# Patient Record
Sex: MALE | Race: BLACK OR AFRICAN AMERICAN | Employment: OTHER | URBAN - METROPOLITAN AREA
[De-identification: names, ages, dates, MRNs, and addresses within clinical notes are randomized per-mention and may not be internally consistent; named-entity substitution may affect disease eponyms.]

---

## 2020-10-28 ENCOUNTER — APPOINTMENT (OUTPATIENT)
Dept: LAB | Facility: HOSPITAL | Age: 60
End: 2020-10-28
Attending: INTERNAL MEDICINE
Payer: MEDICARE

## 2020-10-28 ENCOUNTER — HOSPITAL ENCOUNTER (OUTPATIENT)
Dept: GENERAL RADIOLOGY | Facility: HOSPITAL | Age: 60
Discharge: HOME | End: 2020-10-28
Attending: INTERNAL MEDICINE
Payer: MEDICARE

## 2020-10-28 DIAGNOSIS — Z01.811 PRE-PROCEDURAL RESPIRATORY EXAMINATION: ICD-10-CM

## 2020-10-28 DIAGNOSIS — Z01.810 PREPROCEDURAL CARDIOVASCULAR EXAMINATION: ICD-10-CM

## 2020-10-28 DIAGNOSIS — Z01.812 PRE-OPERATIVE LABORATORY EXAMINATION: ICD-10-CM

## 2020-10-28 PROCEDURE — 93005 ELECTROCARDIOGRAM TRACING: CPT

## 2020-10-28 PROCEDURE — 93010 ELECTROCARDIOGRAM REPORT: CPT | Performed by: INTERNAL MEDICINE

## 2020-10-28 PROCEDURE — 87641 MR-STAPH DNA AMP PROBE: CPT

## 2020-10-28 PROCEDURE — 80048 BASIC METABOLIC PNL TOTAL CA: CPT

## 2020-10-28 PROCEDURE — 36415 COLL VENOUS BLD VENIPUNCTURE: CPT

## 2020-10-28 PROCEDURE — 87086 URINE CULTURE/COLONY COUNT: CPT

## 2020-10-28 PROCEDURE — 71046 X-RAY EXAM CHEST 2 VIEWS: CPT | Performed by: INTERNAL MEDICINE

## 2020-10-28 PROCEDURE — 81001 URINALYSIS AUTO W/SCOPE: CPT

## 2020-10-28 PROCEDURE — 85610 PROTHROMBIN TIME: CPT

## 2020-10-28 PROCEDURE — 85025 COMPLETE CBC W/AUTO DIFF WBC: CPT

## 2020-11-07 ENCOUNTER — LAB ENCOUNTER (OUTPATIENT)
Dept: LAB | Facility: HOSPITAL | Age: 60
End: 2020-11-07
Attending: NEUROLOGICAL SURGERY
Payer: MEDICARE

## 2020-11-07 DIAGNOSIS — Z01.818 PREOP TESTING: ICD-10-CM

## 2020-11-07 PROCEDURE — 86900 BLOOD TYPING SEROLOGIC ABO: CPT

## 2020-11-07 PROCEDURE — 86901 BLOOD TYPING SEROLOGIC RH(D): CPT

## 2020-11-07 PROCEDURE — 36415 COLL VENOUS BLD VENIPUNCTURE: CPT

## 2020-11-07 PROCEDURE — 86850 RBC ANTIBODY SCREEN: CPT

## 2020-12-05 ENCOUNTER — APPOINTMENT (OUTPATIENT)
Dept: LAB | Facility: HOSPITAL | Age: 60
DRG: 460 | End: 2020-12-05
Attending: NEUROLOGICAL SURGERY
Payer: MEDICARE

## 2020-12-05 DIAGNOSIS — Z01.818 PREOPERATIVE TESTING: ICD-10-CM

## 2020-12-05 PROCEDURE — 36415 COLL VENOUS BLD VENIPUNCTURE: CPT

## 2020-12-05 PROCEDURE — 86850 RBC ANTIBODY SCREEN: CPT

## 2020-12-05 PROCEDURE — 86900 BLOOD TYPING SEROLOGIC ABO: CPT

## 2020-12-05 PROCEDURE — 86901 BLOOD TYPING SEROLOGIC RH(D): CPT

## 2020-12-05 RX ORDER — TIZANIDINE 4 MG/1
4 TABLET ORAL 3 TIMES DAILY
COMMUNITY

## 2020-12-05 RX ORDER — ATORVASTATIN CALCIUM 10 MG/1
10 TABLET, FILM COATED ORAL EVERY OTHER DAY
COMMUNITY

## 2020-12-05 RX ORDER — BENAZEPRIL HYDROCHLORIDE AND HYDROCHLOROTHIAZIDE 20; 12.5 MG/1; MG/1
1 TABLET ORAL DAILY
COMMUNITY

## 2020-12-05 RX ORDER — ACETAMINOPHEN 500 MG
1000 TABLET ORAL EVERY 6 HOURS PRN
COMMUNITY

## 2020-12-05 RX ORDER — ASPIRIN 81 MG/1
81 TABLET ORAL DAILY
Status: ON HOLD | COMMUNITY
End: 2020-12-10

## 2020-12-05 RX ORDER — AMLODIPINE BESYLATE 5 MG/1
5 TABLET ORAL NIGHTLY
COMMUNITY

## 2020-12-08 ENCOUNTER — ANESTHESIA (OUTPATIENT)
Dept: SURGERY | Facility: HOSPITAL | Age: 60
DRG: 460 | End: 2020-12-08
Payer: MEDICARE

## 2020-12-08 ENCOUNTER — APPOINTMENT (OUTPATIENT)
Dept: GENERAL RADIOLOGY | Facility: HOSPITAL | Age: 60
DRG: 460 | End: 2020-12-08
Attending: NEUROLOGICAL SURGERY
Payer: MEDICARE

## 2020-12-08 ENCOUNTER — HOSPITAL ENCOUNTER (INPATIENT)
Facility: HOSPITAL | Age: 60
LOS: 2 days | Discharge: HOME | DRG: 460 | End: 2020-12-10
Attending: NEUROLOGICAL SURGERY | Admitting: NEUROLOGICAL SURGERY
Payer: MEDICARE

## 2020-12-08 ENCOUNTER — ANESTHESIA EVENT (OUTPATIENT)
Dept: SURGERY | Facility: HOSPITAL | Age: 60
DRG: 460 | End: 2020-12-08
Payer: MEDICARE

## 2020-12-08 DIAGNOSIS — Z01.818 PREOPERATIVE TESTING: Primary | ICD-10-CM

## 2020-12-08 PROBLEM — E78.5 HYPERLIPIDEMIA: Chronic | Status: ACTIVE | Noted: 2020-12-08

## 2020-12-08 PROBLEM — M43.16 SPONDYLOLISTHESIS, LUMBAR REGION: Status: ACTIVE | Noted: 2020-12-08

## 2020-12-08 PROBLEM — I10 ESSENTIAL HYPERTENSION: Chronic | Status: ACTIVE | Noted: 2020-12-08

## 2020-12-08 PROCEDURE — 0SG00A0 FUSION OF LUMBAR VERTEBRAL JOINT WITH INTERBODY FUSION DEVICE, ANTERIOR APPROACH, ANTERIOR COLUMN, OPEN APPROACH: ICD-10-PCS | Performed by: NEUROLOGICAL SURGERY

## 2020-12-08 PROCEDURE — 4A11X4G MONITORING OF PERIPHERAL NERVOUS ELECTRICAL ACTIVITY, INTRAOPERATIVE, EXTERNAL APPROACH: ICD-10-PCS | Performed by: NEUROLOGICAL SURGERY

## 2020-12-08 PROCEDURE — 76000 FLUOROSCOPY <1 HR PHYS/QHP: CPT | Performed by: NEUROLOGICAL SURGERY

## 2020-12-08 PROCEDURE — 0QH004Z INSERTION OF INTERNAL FIXATION DEVICE INTO LUMBAR VERTEBRA, OPEN APPROACH: ICD-10-PCS | Performed by: NEUROLOGICAL SURGERY

## 2020-12-08 PROCEDURE — 99232 SBSQ HOSP IP/OBS MODERATE 35: CPT | Performed by: HOSPITALIST

## 2020-12-08 PROCEDURE — 0SB20ZZ EXCISION OF LUMBAR VERTEBRAL DISC, OPEN APPROACH: ICD-10-PCS | Performed by: NEUROLOGICAL SURGERY

## 2020-12-08 DEVICE — OSTEOCEL PRO MEDIUM: Type: IMPLANTABLE DEVICE | Site: BACK | Status: FUNCTIONAL

## 2020-12-08 DEVICE — IMPLANTABLE DEVICE: Type: IMPLANTABLE DEVICE | Site: BACK | Status: FUNCTIONAL

## 2020-12-08 DEVICE — RADIAN WASHER FACET SCREW: Type: IMPLANTABLE DEVICE | Site: BACK | Status: FUNCTIONAL

## 2020-12-08 DEVICE — RADIAN FACET SCREW 4.5X30MM: Type: IMPLANTABLE DEVICE | Site: BACK | Status: FUNCTIONAL

## 2020-12-08 RX ORDER — ATORVASTATIN CALCIUM 10 MG/1
10 TABLET, FILM COATED ORAL EVERY OTHER DAY
Status: DISCONTINUED | OUTPATIENT
Start: 2020-12-09 | End: 2020-12-10

## 2020-12-08 RX ORDER — AMLODIPINE BESYLATE 5 MG/1
5 TABLET ORAL NIGHTLY
Status: DISCONTINUED | OUTPATIENT
Start: 2020-12-08 | End: 2020-12-10

## 2020-12-08 RX ORDER — HYDROCODONE BITARTRATE AND ACETAMINOPHEN 10; 325 MG/1; MG/1
1 TABLET ORAL EVERY 4 HOURS PRN
Status: DISCONTINUED | OUTPATIENT
Start: 2020-12-08 | End: 2020-12-10

## 2020-12-08 RX ORDER — CEFAZOLIN SODIUM/WATER 2 G/20 ML
2 SYRINGE (ML) INTRAVENOUS EVERY 8 HOURS
Status: COMPLETED | OUTPATIENT
Start: 2020-12-08 | End: 2020-12-09

## 2020-12-08 RX ORDER — TIZANIDINE 4 MG/1
4 TABLET ORAL EVERY 8 HOURS PRN
Status: DISCONTINUED | OUTPATIENT
Start: 2020-12-08 | End: 2020-12-08

## 2020-12-08 RX ORDER — SENNOSIDES 8.6 MG
17.2 TABLET ORAL NIGHTLY
Status: DISCONTINUED | OUTPATIENT
Start: 2020-12-08 | End: 2020-12-10

## 2020-12-08 RX ORDER — PROCHLORPERAZINE EDISYLATE 5 MG/ML
10 INJECTION INTRAMUSCULAR; INTRAVENOUS EVERY 6 HOURS PRN
Status: DISCONTINUED | OUTPATIENT
Start: 2020-12-08 | End: 2020-12-10

## 2020-12-08 RX ORDER — BENAZEPRIL HYDROCHLORIDE AND HYDROCHLOROTHIAZIDE 20; 12.5 MG/1; MG/1
1 TABLET ORAL DAILY
Status: DISCONTINUED | OUTPATIENT
Start: 2020-12-09 | End: 2020-12-08 | Stop reason: RX

## 2020-12-08 RX ORDER — HYDROMORPHONE HYDROCHLORIDE 1 MG/ML
0.2 INJECTION, SOLUTION INTRAMUSCULAR; INTRAVENOUS; SUBCUTANEOUS EVERY 5 MIN PRN
Status: DISCONTINUED | OUTPATIENT
Start: 2020-12-08 | End: 2020-12-08 | Stop reason: HOSPADM

## 2020-12-08 RX ORDER — FAMOTIDINE 20 MG/1
20 TABLET ORAL ONCE
Status: COMPLETED | OUTPATIENT
Start: 2020-12-08 | End: 2020-12-08

## 2020-12-08 RX ORDER — SODIUM CHLORIDE, SODIUM LACTATE, POTASSIUM CHLORIDE, CALCIUM CHLORIDE 600; 310; 30; 20 MG/100ML; MG/100ML; MG/100ML; MG/100ML
INJECTION, SOLUTION INTRAVENOUS CONTINUOUS
Status: DISCONTINUED | OUTPATIENT
Start: 2020-12-08 | End: 2020-12-10

## 2020-12-08 RX ORDER — GLYCOPYRROLATE 0.2 MG/ML
INJECTION, SOLUTION INTRAMUSCULAR; INTRAVENOUS AS NEEDED
Status: DISCONTINUED | OUTPATIENT
Start: 2020-12-08 | End: 2020-12-08 | Stop reason: SURG

## 2020-12-08 RX ORDER — MORPHINE SULFATE 4 MG/ML
2 INJECTION, SOLUTION INTRAMUSCULAR; INTRAVENOUS EVERY 10 MIN PRN
Status: DISCONTINUED | OUTPATIENT
Start: 2020-12-08 | End: 2020-12-08 | Stop reason: HOSPADM

## 2020-12-08 RX ORDER — DIPHENHYDRAMINE HCL 25 MG
25 CAPSULE ORAL EVERY 4 HOURS PRN
Status: DISCONTINUED | OUTPATIENT
Start: 2020-12-08 | End: 2020-12-10

## 2020-12-08 RX ORDER — MIDAZOLAM HYDROCHLORIDE 1 MG/ML
INJECTION INTRAMUSCULAR; INTRAVENOUS AS NEEDED
Status: DISCONTINUED | OUTPATIENT
Start: 2020-12-08 | End: 2020-12-08 | Stop reason: SURG

## 2020-12-08 RX ORDER — ACETAMINOPHEN 325 MG/1
650 TABLET ORAL EVERY 4 HOURS PRN
Status: DISCONTINUED | OUTPATIENT
Start: 2020-12-08 | End: 2020-12-10

## 2020-12-08 RX ORDER — MORPHINE SULFATE 4 MG/ML
4 INJECTION, SOLUTION INTRAMUSCULAR; INTRAVENOUS EVERY 10 MIN PRN
Status: DISCONTINUED | OUTPATIENT
Start: 2020-12-08 | End: 2020-12-08 | Stop reason: HOSPADM

## 2020-12-08 RX ORDER — MORPHINE SULFATE 10 MG/ML
6 INJECTION, SOLUTION INTRAMUSCULAR; INTRAVENOUS EVERY 10 MIN PRN
Status: DISCONTINUED | OUTPATIENT
Start: 2020-12-08 | End: 2020-12-08 | Stop reason: HOSPADM

## 2020-12-08 RX ORDER — PHENYLEPHRINE HCL 10 MG/ML
VIAL (ML) INJECTION AS NEEDED
Status: DISCONTINUED | OUTPATIENT
Start: 2020-12-08 | End: 2020-12-08 | Stop reason: SURG

## 2020-12-08 RX ORDER — ONDANSETRON 2 MG/ML
4 INJECTION INTRAMUSCULAR; INTRAVENOUS EVERY 4 HOURS PRN
Status: ACTIVE | OUTPATIENT
Start: 2020-12-08 | End: 2020-12-09

## 2020-12-08 RX ORDER — DIPHENHYDRAMINE HYDROCHLORIDE 50 MG/ML
25 INJECTION INTRAMUSCULAR; INTRAVENOUS EVERY 4 HOURS PRN
Status: DISCONTINUED | OUTPATIENT
Start: 2020-12-08 | End: 2020-12-10

## 2020-12-08 RX ORDER — TIZANIDINE 4 MG/1
4 TABLET ORAL 3 TIMES DAILY
Status: DISCONTINUED | OUTPATIENT
Start: 2020-12-08 | End: 2020-12-10

## 2020-12-08 RX ORDER — HYDROMORPHONE HYDROCHLORIDE 1 MG/ML
0.6 INJECTION, SOLUTION INTRAMUSCULAR; INTRAVENOUS; SUBCUTANEOUS EVERY 5 MIN PRN
Status: DISCONTINUED | OUTPATIENT
Start: 2020-12-08 | End: 2020-12-08 | Stop reason: HOSPADM

## 2020-12-08 RX ORDER — BISACODYL 10 MG
10 SUPPOSITORY, RECTAL RECTAL
Status: DISCONTINUED | OUTPATIENT
Start: 2020-12-08 | End: 2020-12-10

## 2020-12-08 RX ORDER — ROCURONIUM BROMIDE 10 MG/ML
INJECTION, SOLUTION INTRAVENOUS AS NEEDED
Status: DISCONTINUED | OUTPATIENT
Start: 2020-12-08 | End: 2020-12-08 | Stop reason: SURG

## 2020-12-08 RX ORDER — HYDROCODONE BITARTRATE AND ACETAMINOPHEN 5; 325 MG/1; MG/1
1 TABLET ORAL AS NEEDED
Status: DISCONTINUED | OUTPATIENT
Start: 2020-12-08 | End: 2020-12-08 | Stop reason: HOSPADM

## 2020-12-08 RX ORDER — HYDROMORPHONE HYDROCHLORIDE 1 MG/ML
0.4 INJECTION, SOLUTION INTRAMUSCULAR; INTRAVENOUS; SUBCUTANEOUS EVERY 5 MIN PRN
Status: DISCONTINUED | OUTPATIENT
Start: 2020-12-08 | End: 2020-12-08 | Stop reason: HOSPADM

## 2020-12-08 RX ORDER — PROCHLORPERAZINE EDISYLATE 5 MG/ML
5 INJECTION INTRAMUSCULAR; INTRAVENOUS ONCE AS NEEDED
Status: DISCONTINUED | OUTPATIENT
Start: 2020-12-08 | End: 2020-12-08 | Stop reason: HOSPADM

## 2020-12-08 RX ORDER — LIDOCAINE HYDROCHLORIDE 10 MG/ML
INJECTION, SOLUTION EPIDURAL; INFILTRATION; INTRACAUDAL; PERINEURAL AS NEEDED
Status: DISCONTINUED | OUTPATIENT
Start: 2020-12-08 | End: 2020-12-08 | Stop reason: SURG

## 2020-12-08 RX ORDER — SODIUM PHOSPHATE, DIBASIC AND SODIUM PHOSPHATE, MONOBASIC 7; 19 G/133ML; G/133ML
1 ENEMA RECTAL ONCE AS NEEDED
Status: DISCONTINUED | OUTPATIENT
Start: 2020-12-08 | End: 2020-12-10

## 2020-12-08 RX ORDER — METOCLOPRAMIDE 10 MG/1
10 TABLET ORAL ONCE
Status: COMPLETED | OUTPATIENT
Start: 2020-12-08 | End: 2020-12-08

## 2020-12-08 RX ORDER — HYDROCODONE BITARTRATE AND ACETAMINOPHEN 10; 325 MG/1; MG/1
2 TABLET ORAL EVERY 4 HOURS PRN
Status: DISCONTINUED | OUTPATIENT
Start: 2020-12-08 | End: 2020-12-10

## 2020-12-08 RX ORDER — ACETAMINOPHEN 500 MG
1000 TABLET ORAL ONCE
Status: DISCONTINUED | OUTPATIENT
Start: 2020-12-08 | End: 2020-12-08 | Stop reason: HOSPADM

## 2020-12-08 RX ORDER — HYDROMORPHONE HYDROCHLORIDE 1 MG/ML
0.2 INJECTION, SOLUTION INTRAMUSCULAR; INTRAVENOUS; SUBCUTANEOUS EVERY 2 HOUR PRN
Status: DISCONTINUED | OUTPATIENT
Start: 2020-12-08 | End: 2020-12-10

## 2020-12-08 RX ORDER — EPHEDRINE SULFATE 50 MG/ML
INJECTION, SOLUTION INTRAVENOUS AS NEEDED
Status: DISCONTINUED | OUTPATIENT
Start: 2020-12-08 | End: 2020-12-08 | Stop reason: SURG

## 2020-12-08 RX ORDER — SODIUM CHLORIDE 9 MG/ML
INJECTION, SOLUTION INTRAVENOUS CONTINUOUS PRN
Status: DISCONTINUED | OUTPATIENT
Start: 2020-12-08 | End: 2020-12-08 | Stop reason: SURG

## 2020-12-08 RX ORDER — POLYETHYLENE GLYCOL 3350 17 G/17G
17 POWDER, FOR SOLUTION ORAL DAILY PRN
Status: DISCONTINUED | OUTPATIENT
Start: 2020-12-08 | End: 2020-12-10

## 2020-12-08 RX ORDER — SODIUM CHLORIDE, SODIUM LACTATE, POTASSIUM CHLORIDE, CALCIUM CHLORIDE 600; 310; 30; 20 MG/100ML; MG/100ML; MG/100ML; MG/100ML
INJECTION, SOLUTION INTRAVENOUS CONTINUOUS
Status: DISCONTINUED | OUTPATIENT
Start: 2020-12-08 | End: 2020-12-08 | Stop reason: HOSPADM

## 2020-12-08 RX ORDER — HYDROMORPHONE HYDROCHLORIDE 1 MG/ML
0.8 INJECTION, SOLUTION INTRAMUSCULAR; INTRAVENOUS; SUBCUTANEOUS EVERY 2 HOUR PRN
Status: DISCONTINUED | OUTPATIENT
Start: 2020-12-08 | End: 2020-12-10

## 2020-12-08 RX ORDER — DOCUSATE SODIUM 100 MG/1
100 CAPSULE, LIQUID FILLED ORAL 2 TIMES DAILY
Status: DISCONTINUED | OUTPATIENT
Start: 2020-12-08 | End: 2020-12-10

## 2020-12-08 RX ORDER — HYDROCODONE BITARTRATE AND ACETAMINOPHEN 5; 325 MG/1; MG/1
2 TABLET ORAL AS NEEDED
Status: DISCONTINUED | OUTPATIENT
Start: 2020-12-08 | End: 2020-12-08 | Stop reason: HOSPADM

## 2020-12-08 RX ORDER — ONDANSETRON 2 MG/ML
4 INJECTION INTRAMUSCULAR; INTRAVENOUS ONCE AS NEEDED
Status: DISCONTINUED | OUTPATIENT
Start: 2020-12-08 | End: 2020-12-08 | Stop reason: HOSPADM

## 2020-12-08 RX ORDER — CEFAZOLIN SODIUM/WATER 2 G/20 ML
2 SYRINGE (ML) INTRAVENOUS ONCE
Status: COMPLETED | OUTPATIENT
Start: 2020-12-08 | End: 2020-12-08

## 2020-12-08 RX ORDER — NALOXONE HYDROCHLORIDE 0.4 MG/ML
80 INJECTION, SOLUTION INTRAMUSCULAR; INTRAVENOUS; SUBCUTANEOUS AS NEEDED
Status: DISCONTINUED | OUTPATIENT
Start: 2020-12-08 | End: 2020-12-08 | Stop reason: HOSPADM

## 2020-12-08 RX ORDER — ONDANSETRON 2 MG/ML
INJECTION INTRAMUSCULAR; INTRAVENOUS AS NEEDED
Status: DISCONTINUED | OUTPATIENT
Start: 2020-12-08 | End: 2020-12-08 | Stop reason: SURG

## 2020-12-08 RX ORDER — HYDROMORPHONE HYDROCHLORIDE 1 MG/ML
0.4 INJECTION, SOLUTION INTRAMUSCULAR; INTRAVENOUS; SUBCUTANEOUS EVERY 2 HOUR PRN
Status: DISCONTINUED | OUTPATIENT
Start: 2020-12-08 | End: 2020-12-10

## 2020-12-08 RX ADMIN — MIDAZOLAM HYDROCHLORIDE 2 MG: 1 INJECTION INTRAMUSCULAR; INTRAVENOUS at 10:26:00

## 2020-12-08 RX ADMIN — EPHEDRINE SULFATE 5 MG: 50 INJECTION, SOLUTION INTRAVENOUS at 13:13:00

## 2020-12-08 RX ADMIN — CEFAZOLIN SODIUM/WATER 2 G: 2 G/20 ML SYRINGE (ML) INTRAVENOUS at 10:46:00

## 2020-12-08 RX ADMIN — PHENYLEPHRINE HCL 100 MCG: 10 MG/ML VIAL (ML) INJECTION at 11:57:00

## 2020-12-08 RX ADMIN — MIDAZOLAM HYDROCHLORIDE 1 MG: 1 INJECTION INTRAMUSCULAR; INTRAVENOUS at 10:44:00

## 2020-12-08 RX ADMIN — LIDOCAINE HYDROCHLORIDE 50 MG: 10 INJECTION, SOLUTION EPIDURAL; INFILTRATION; INTRACAUDAL; PERINEURAL at 10:31:00

## 2020-12-08 RX ADMIN — ROCURONIUM BROMIDE 5 MG: 10 INJECTION, SOLUTION INTRAVENOUS at 10:31:00

## 2020-12-08 RX ADMIN — PHENYLEPHRINE HCL 100 MCG: 10 MG/ML VIAL (ML) INJECTION at 12:38:00

## 2020-12-08 RX ADMIN — EPHEDRINE SULFATE 5 MG: 50 INJECTION, SOLUTION INTRAVENOUS at 12:59:00

## 2020-12-08 RX ADMIN — SODIUM CHLORIDE: 9 INJECTION, SOLUTION INTRAVENOUS at 11:00:00

## 2020-12-08 RX ADMIN — ONDANSETRON 4 MG: 2 INJECTION INTRAMUSCULAR; INTRAVENOUS at 10:27:00

## 2020-12-08 RX ADMIN — SODIUM CHLORIDE, SODIUM LACTATE, POTASSIUM CHLORIDE, CALCIUM CHLORIDE: 600; 310; 30; 20 INJECTION, SOLUTION INTRAVENOUS at 14:04:00

## 2020-12-08 RX ADMIN — LIDOCAINE HYDROCHLORIDE 50 MG: 10 INJECTION, SOLUTION EPIDURAL; INFILTRATION; INTRACAUDAL; PERINEURAL at 11:29:00

## 2020-12-08 RX ADMIN — PHENYLEPHRINE HCL 100 MCG: 10 MG/ML VIAL (ML) INJECTION at 11:51:00

## 2020-12-08 RX ADMIN — GLYCOPYRROLATE 0.2 MG: 0.2 INJECTION, SOLUTION INTRAMUSCULAR; INTRAVENOUS at 10:27:00

## 2020-12-08 NOTE — OPERATIVE REPORT
OPERATIVE REPORT      PATIENT NAME:  Eun Hoskins    DATE OF OPERATION:  12/08/2020      PREOPERATIVE DIAGNOSES:  1. Lumbar spondylosis with radiculopathy. 2.  Lumbar spondylolisthesis. POSTOPERATIVE DIAGNOSES:  1.   Lumbar spondylosis w lateral fascia. The lateral fascia was then opened longitudinally.   Then, finger dissection was used to open the lateral musculature, using finger dissection the retroperitoneal space was also opened, palpating along the back wall of the retroperitoneum, reduction in the spondylolisthesis. This also matched the adjacent disc space heights nicely. The trial was removed and the rasps and curettes were used to complete preparation of the endplate.   Next, a 22 mm x 10 mm x 60 mm 10° titanium lordotic cage was facet joint. The K wire was then removed and the facet screw was advanced the remainder of the way into the left L5 pedicle. The screw was stimulated and again greater than 20 mA of impedance was documented.   Tightness along the screw was verified and th

## 2020-12-08 NOTE — ANESTHESIA PROCEDURE NOTES
Airway  Urgency: Elective    Airway not difficult    General Information and Staff    Patient location during procedure: OR  Anesthesiologist: Miguelito Varner MD  Resident/CRNA: Nick Chavez CRNA  Performed: anesthesiologist and CRNA     Daphne

## 2020-12-08 NOTE — PROGRESS NOTES
[facility] - [facility]    Progress Note    Juniejeremi Andrade Patient Status:  Inpatient    1960 MRN [id number]   Location [address] [facility] Attending Rosi Manuel MD   Hosp Day # 0 PCP ALMITA Marino MD of percutaneous bilateral L4-5 trans-facet screws. PAIN CONTROL, NEURO CHECKS, DVT PROPHYLAXIS, PT/OT. Essential hypertension  CONT HOME MEDS, MONITOR. Hyperlipidemia  CONT HOME MEDS.              Results:     Lab Results   Component Value Date

## 2020-12-08 NOTE — BRIEF OP NOTE
Pre-Operative Diagnosis: other spondylosis with radiculopathy, lumbar egion     Post-Operative Diagnosis: other spondylosis with radiculopathy, lumbar egion      Procedure Performed:   Procedure(s):  L4-5 extreme lateral interbody fusion with metallic cage

## 2020-12-08 NOTE — ANESTHESIA PREPROCEDURE EVALUATION
Anesthesia PreOp Note    HPI:     Rakesh Rangel is a 56year old male who presents for preoperative consultation requested by: Jose Power MD    Date of Surgery: 12/8/2020    Procedure(s):  FAR LATERAL LUMBAR INTERBODY FUSION 1 LEVEL  Indication: o 12/8/2020 at 540        •  lactated ringers infusion, , Intravenous, Continuous, Yany Glynn MD, Last Rate: 20 mL/hr at 12/08/20 9099    •  acetaminophen (TYLENOL EXTRA STRENGTH) tab 1,000 mg, 1,000 mg, Oral, Once, Yany Glynn MD    •  ceFAZolin Not on file      Available pre-op labs reviewed. Lab Results   Component Value Date    WBC 4.2 10/28/2020    RBC 4.99 10/28/2020    HGB 15.0 10/28/2020    HCT 44.7 10/28/2020    MCV 89.6 10/28/2020    MCH 30.1 10/28/2020    MCHC 33.6 10/28/2020    RDW 12. of my ability. The patient desires the anesthetic management as planned.   Dorian NO  12/8/2020 9:35 AM

## 2020-12-08 NOTE — ANESTHESIA POSTPROCEDURE EVALUATION
Patient: Nay Bermudez    Procedure Summary     Date: 12/08/20 Room / Location: [facility] OR  / [facility] OR    Anesthesia Start: 5629 Anesthesia Stop:     Procedure: FAR LATERAL LUMBAR INTERBODY FUSION 1 LEVEL (N/A Back) Diagnosis: (other spondylosis w

## 2020-12-09 PROCEDURE — 99233 SBSQ HOSP IP/OBS HIGH 50: CPT | Performed by: HOSPITALIST

## 2020-12-09 NOTE — OCCUPATIONAL THERAPY NOTE
OCCUPATIONAL THERAPY EVALUATION - INPATIENT      Room Number: 413/413-A  Evaluation Date: 12/9/2020  Type of Evaluation: Initial  Presenting Problem: Pt s/p L4-5 extreme lateral interbody fusion 12/8 d/t spondylosis with radiculopathy.      Physician Order: Problems:    Essential hypertension    Hyperlipidemia      Past Medical History  Past Medical History:   Diagnosis Date   • Back problem    • Calculus of kidney     20 yrs ago [facility]    • High blood pressure    • High cholesterol    • Osteoarthritis    • PONV strength is within functional limits      ACTIVITIES OF DAILY LIVING ASSESSMENT  AM-PAC ‘6-Clicks’ Inpatient Daily Activity Short Form  How much help from another person does the patient currently need…  -   Putting on and taking off regular lower body serge

## 2020-12-09 NOTE — PLAN OF CARE
Problem: Patient Centered Care  Goal: Patient preferences are identified and integrated in the patient's plan of care  Description: Interventions:  - What would you like us to know as we care for you? Lives with his wife in their own home.   His wife had monitor pain and request assistance  - Assess pain using appropriate pain scale  - Administer analgesics based on type and severity of pain and evaluate response  - Implement non-pharmacological measures as appropriate and evaluate response  - Consider cul post-discharge preferences of patient/family/discharge partner  - Complete POLST form as appropriate  - Assess patient's ability to be responsible for managing their own health  - Refer to Case Management Department for coordinating discharge planning if t

## 2020-12-09 NOTE — PAYOR COMM NOTE
--------------  CONTINUED STAY REVIEW    Payor: Gabriel Asher  Subscriber #:  Jayy Camps Number: [id number]    Admit date: 12/8/20  Admit time: 4777    Admitting Physician: Jose Power MD  Attending Physician:  Patricia Ross MD OF OPERATION:  12/08/2020  PREOPERATIVE DIAGNOSES:  1. Lumbar spondylosis with radiculopathy. 2.  Lumbar spondylolisthesis. POSTOPERATIVE DIAGNOSES:  1. Lumbar spondylosis with radiculopathy. 2.  Lumbar spondylolisthesis. OPERATIVE PROCEDURE:  1. dissection was used to open the lateral musculature, using finger dissection the retroperitoneal space was also opened, palpating along the back wall of the retroperitoneum, over the transverse processes to the psoas.   Using first the most slender stimulat nicely. The trial was removed and the rasps and curettes were used to complete preparation of the endplate. Next, a 22 mm x 10 mm x 60 mm 10° titanium lordotic cage was filled with Osteocel Pro that had been prepared in the back table.   The cage was attac remainder of the way into the left L5 pedicle. The screw was stimulated and again greater than 20 mA of impedance was documented. Tightness along the screw was verified and the screwdriver and guide were removed.   A similar procedure was undertaken to pl Action Dose Route User    12/8/2020 1313 Given 5 mg Intravenous Cynda Commodore ANNIE CRNA    12/8/2020 1259 Given 5 mg Intravenous Cynda Commodore ANNIE CRNA      famoTIDine (PEPCID) tab 20 mg     Date Action Dose Route User    12/8/2020 1333 Given Midazolam HCl (VERSED) 2 MG/2ML injection     Date Action Dose Route User    12/8/2020 1044 Given 1 mg Intravenous Jewel Yady M, CRNA    12/8/2020 1026 Given 2 mg Intravenous Jewel Yady M, DEEJAY      ondansetron HCl (ZOFRAN) injectio

## 2020-12-09 NOTE — PLAN OF CARE
Problem: Patient Centered Care  Goal: Patient preferences are identified and integrated in the patient's plan of care  Description: Interventions:  - What would you like us to know as we care for you?   - Provide timely, complete, and accurate informatio resources  Description: INTERVENTIONS:  - Identify barriers to discharge w/pt and caregiver  - Include patient/family/discharge partner in discharge planning  - Arrange for needed discharge resources and transportation as appropriate  - Identify discharge

## 2020-12-09 NOTE — PROGRESS NOTES
[facility]    Progress Note    Juniejeremi Andrade Patient Status:  Inpatient    1960 MRN [id number]   Location [address] [facility] Attending Davi Plascencia MD   Hosp Day # 1 PCP ALMITA Villalobos MD MD on 12/08/2020 at 2:39 PM

## 2020-12-10 VITALS
SYSTOLIC BLOOD PRESSURE: 126 MMHG | OXYGEN SATURATION: 97 % | BODY MASS INDEX: 31.78 KG/M2 | RESPIRATION RATE: 16 BRPM | DIASTOLIC BLOOD PRESSURE: 84 MMHG | HEART RATE: 86 BPM | HEIGHT: 71 IN | WEIGHT: 227 LBS | TEMPERATURE: 99 F

## 2020-12-10 PROCEDURE — 99239 HOSP IP/OBS DSCHRG MGMT >30: CPT | Performed by: HOSPITALIST

## 2020-12-10 NOTE — PLAN OF CARE
Problem: Patient Centered Care  Goal: Patient preferences are identified and integrated in the patient's plan of care  Description: Interventions:  - What would you like us to know as we care for you? Lives with his wife in their own home.   His wife had monitor pain and request assistance  - Assess pain using appropriate pain scale  - Administer analgesics based on type and severity of pain and evaluate response  - Implement non-pharmacological measures as appropriate and evaluate response  - Consider cul post-discharge preferences of patient/family/discharge partner  - Complete POLST form as appropriate  - Assess patient's ability to be responsible for managing their own health  - Refer to Case Management Department for coordinating discharge planning if t Increased nutrient needs (specify)

## 2020-12-10 NOTE — PROGRESS NOTES
S:  Patient complains of incision pain in left flank region, ice packs to area help. Some pain in back, but tolerable with medication. Has been up ambulating in hallway.     O:   12/10/20  0436   BP: 138/90   Pulse: 89   Resp: 16   Temp: 99.4 °F (37.4 °C)

## 2020-12-10 NOTE — DISCHARGE SUMMARY
[facility]    Discharge Summary    Chay Andrade Patient Status:  Inpatient    1960 MRN [id number]   Location [facility] 4W/SW/SE Attending Myles Ragsdale MD   Hosp Day # 2 PCP ALMITA Villalobos MD     Date of Admissio Tabs  Commonly known as: NORVASC      Take 5 mg by mouth nightly. Refills: 0     atorvastatin 10 MG Tabs  Commonly known as: LIPITOR      Take 10 mg by mouth every other day.    Refills: 0     Benazepril-hydroCHLOROthiazide 20-12.5 MG Tabs  Commonly known

## 2020-12-10 NOTE — PLAN OF CARE
Problem: Patient Centered Care  Goal: Patient preferences are identified and integrated in the patient's plan of care  Description: Interventions:  - What would you like us to know as we care for you? Lives with his wife in their own home.   His wife had INTERVENTIONS:  - Encourage pt to monitor pain and request assistance  - Assess pain using appropriate pain scale  - Administer analgesics based on type and severity of pain and evaluate response  - Implement non-pharmacological measures as appropriate and interpreters to assist at discharge as needed  - Consider post-discharge preferences of patient/family/discharge partner  - Complete POLST form as appropriate  - Assess patient's ability to be responsible for managing their own health  - Refer to [facility]

## 2020-12-15 NOTE — PAYOR COMM NOTE
--------------  DISCHARGE REVIEW    Payor: Reva Mendez  Subscriber #:  [address] Number: [id number]    Admit date: 12/8/20  Admit time:  2039  Discharge Date: 12/10/2020 11:13 AM     Admitting Physician: Silvia Ortiz MD  Attendin intervention.      Hospital Course:   Spondylolisthesis, lumbar region  - s/p extreme lateral interbody fusion, L4-5 with L4-5 discectomy and fusion  - cont pt/ot  - pain control with oral meds     Essential hypertension  CONT HOME MEDS, MONITOR.      Hyper

## 2021-01-09 ENCOUNTER — HOSPITAL ENCOUNTER (OUTPATIENT)
Dept: GENERAL RADIOLOGY | Facility: HOSPITAL | Age: 61
Discharge: HOME | End: 2021-01-09
Attending: NEUROLOGICAL SURGERY
Payer: MEDICARE

## 2021-01-09 DIAGNOSIS — M43.16 SPONDYLOLISTHESIS OF LUMBAR REGION: ICD-10-CM

## 2021-01-09 PROCEDURE — 72100 X-RAY EXAM L-S SPINE 2/3 VWS: CPT | Performed by: NEUROLOGICAL SURGERY

## 2021-03-12 DIAGNOSIS — Z23 NEED FOR VACCINATION: ICD-10-CM

## 2021-03-15 ENCOUNTER — IMMUNIZATION (OUTPATIENT)
Dept: LAB | Facility: HOSPITAL | Age: 61
End: 2021-03-15
Attending: HOSPITALIST
Payer: MEDICARE

## 2021-03-15 DIAGNOSIS — Z23 NEED FOR VACCINATION: Primary | ICD-10-CM

## 2021-03-15 PROCEDURE — 0011A SARSCOV2 VAC 100MCG/0.5ML IM: CPT

## 2021-04-12 ENCOUNTER — IMMUNIZATION (OUTPATIENT)
Dept: LAB | Facility: HOSPITAL | Age: 61
End: 2021-04-12
Attending: EMERGENCY MEDICINE
Payer: MEDICARE

## 2021-04-12 DIAGNOSIS — Z23 NEED FOR VACCINATION: Primary | ICD-10-CM

## 2021-04-12 PROCEDURE — 0012A SARSCOV2 VAC 100MCG/0.5ML IM: CPT

## 2021-04-20 ENCOUNTER — HOSPITAL ENCOUNTER (OUTPATIENT)
Dept: GENERAL RADIOLOGY | Facility: HOSPITAL | Age: 61
Discharge: HOME | End: 2021-04-20
Attending: NEUROLOGICAL SURGERY
Payer: MEDICARE

## 2021-04-20 DIAGNOSIS — M43.16 SPONDYLOLISTHESIS OF LUMBAR REGION: ICD-10-CM

## 2021-04-20 PROCEDURE — 72100 X-RAY EXAM L-S SPINE 2/3 VWS: CPT | Performed by: NEUROLOGICAL SURGERY

## 2021-04-30 ENCOUNTER — HOSPITAL ENCOUNTER (OUTPATIENT)
Dept: CT IMAGING | Facility: HOSPITAL | Age: 61
Discharge: HOME | End: 2021-04-30
Attending: NEUROLOGICAL SURGERY
Payer: MEDICARE

## 2021-04-30 DIAGNOSIS — M43.16 SPONDYLOLISTHESIS OF LUMBAR REGION: ICD-10-CM

## 2021-04-30 PROCEDURE — 72131 CT LUMBAR SPINE W/O DYE: CPT | Performed by: NEUROLOGICAL SURGERY

## 2021-10-28 ENCOUNTER — IMMUNIZATION (OUTPATIENT)
Dept: LAB | Facility: HOSPITAL | Age: 61
End: 2021-10-28
Attending: EMERGENCY MEDICINE
Payer: MEDICARE

## 2021-10-28 DIAGNOSIS — Z23 NEED FOR VACCINATION: Primary | ICD-10-CM

## 2021-10-28 PROCEDURE — 0064A SARSCOV2 VAC 50MCG/0.25ML IM: CPT

## 2021-11-29 ENCOUNTER — HOSPITAL ENCOUNTER (OUTPATIENT)
Dept: GENERAL RADIOLOGY | Facility: HOSPITAL | Age: 61
Discharge: HOME | End: 2021-11-29
Attending: PHYSICIAN ASSISTANT
Payer: MEDICARE

## 2021-11-29 DIAGNOSIS — M47.26 OTHER SPONDYLOSIS WITH RADICULOPATHY, LUMBAR REGION: ICD-10-CM

## 2021-11-29 DIAGNOSIS — M43.16 SPONDYLOLISTHESIS, LUMBAR REGION: ICD-10-CM

## 2021-11-29 PROCEDURE — 72110 X-RAY EXAM L-2 SPINE 4/>VWS: CPT | Performed by: PHYSICIAN ASSISTANT

## 2022-08-18 ENCOUNTER — TELEPHONE (OUTPATIENT)
Dept: RADIATION ONCOLOGY | Facility: HOSPITAL | Age: 62
End: 2022-08-18

## 2022-08-18 NOTE — TELEPHONE ENCOUNTER
Patient calling to schedule Consult with Dr Pushpa Villarreal. Referred by [employer]  DX. Prostate Cancer.     Will be sending medicals to [facility]

## 2022-08-25 ENCOUNTER — TELEPHONE (OUTPATIENT)
Dept: RADIATION ONCOLOGY | Facility: HOSPITAL | Age: 62
End: 2022-08-25

## 2022-09-09 ENCOUNTER — SOCIAL WORK SERVICES (OUTPATIENT)
Dept: HEMATOLOGY/ONCOLOGY | Facility: HOSPITAL | Age: 62
End: 2022-09-09

## 2022-09-09 ENCOUNTER — OFFICE VISIT (OUTPATIENT)
Dept: RADIATION ONCOLOGY | Facility: HOSPITAL | Age: 62
End: 2022-09-09
Attending: RADIOLOGY
Payer: MEDICARE

## 2022-09-09 VITALS
RESPIRATION RATE: 18 BRPM | TEMPERATURE: 98 F | WEIGHT: 232 LBS | HEART RATE: 84 BPM | BODY MASS INDEX: 32 KG/M2 | OXYGEN SATURATION: 98 % | SYSTOLIC BLOOD PRESSURE: 120 MMHG | DIASTOLIC BLOOD PRESSURE: 62 MMHG

## 2022-09-09 PROCEDURE — 99212 OFFICE O/P EST SF 10 MIN: CPT

## 2022-09-09 RX ORDER — TAMSULOSIN HYDROCHLORIDE 0.4 MG/1
CAPSULE ORAL
COMMUNITY
Start: 2022-06-15

## 2022-09-09 RX ORDER — ASPIRIN 81 MG/1
TABLET ORAL
COMMUNITY

## 2022-09-09 RX ORDER — AMLODIPINE BESYLATE 10 MG/1
10 TABLET ORAL DAILY
COMMUNITY
Start: 2022-07-17

## 2022-09-09 RX ORDER — DICLOFENAC SODIUM 75 MG/1
TABLET, DELAYED RELEASE ORAL
COMMUNITY
Start: 2022-08-11

## 2022-09-09 RX ORDER — BACLOFEN 10 MG/1
TABLET ORAL
COMMUNITY

## 2022-09-09 NOTE — PROGRESS NOTES
SW received notice of patient's distress screen score of 8. Per nursing patient is declining SW at this time.

## 2022-09-09 NOTE — PATIENT INSTRUCTIONS
You will get a call to schedule your CT simulation. Call [phone] for radiation questions or concerns.

## 2022-09-14 ENCOUNTER — APPOINTMENT (OUTPATIENT)
Dept: RADIATION ONCOLOGY | Facility: HOSPITAL | Age: 62
End: 2022-09-14
Attending: RADIOLOGY
Payer: MEDICARE

## 2022-09-26 ENCOUNTER — OFFICE VISIT (OUTPATIENT)
Dept: RADIATION ONCOLOGY | Facility: HOSPITAL | Age: 62
End: 2022-09-26
Attending: RADIOLOGY
Payer: MEDICARE

## 2022-09-26 NOTE — PROGRESS NOTES
[facility] Radiation Treatment Management Note 1-5      Primary Rad/Onc:  Dr. Pedro Fields Beltran    Site Delivered Dose (cGy) Prescribed Dose (cGy) Fraction #   prostate [id number] 1/28           First treatment date:  9/26/2022  Concurrent chemotherapy:  None    DIAGNOSIS :  Stage II T1c N0 M0 , G3+4, carcinoma of the prostate status post biopsy pretreatment PSA max 5.49 for definitive treatment    Physician Note:  Subjective:    Doing well no issues      Objective:    NAD  No new findings        Treatment setup imaging have been reviewed:   Yes    Assessment/Plan:    Doing well no issues, new start today     Reviewed potential side effects  On Flomax  Nocturia x 0-1 at baseline      Continue radiotherapy per plan    Next visit:  1 week    Dr. Mayra Batres

## 2022-09-29 ENCOUNTER — DIETICIAN VISIT (OUTPATIENT)
Dept: NUTRITION | Facility: HOSPITAL | Age: 62
End: 2022-09-29

## 2022-09-29 VITALS — WEIGHT: 234 LBS | BODY MASS INDEX: 33 KG/M2

## 2022-09-30 NOTE — PROGRESS NOTES
[facility] Radiation Treatment Management Note 6-10    Patient:  Chay Andrade  Age:  57year old  Visit Diagnosis:  No diagnosis found. Primary Rad/Onc:  Dr. Hugo Thompson    Site Delivered Dose (cGy) Prescribed Dose (cGy) Fraction #   Prostate 1500 7000 6/28           First treatment date:  9/26/2022  Concurrent chemotherapy:  None    Oncology Vitals 12/10/2020 9/9/2022 9/29/2022   Height - - -   Height - - -   Weight - 232 lb 234 lb   Weight - 105.235 kg 106.142 kg   BSA (m2) - 2.25 m2 2.25 m2   /84 120/62 -   Pulse 86 84 -   Resp 16 18 -   Temp 98.8 97.6 -   SpO2 97 98 -   Pain Score - 0 -   Some recent data might be hidden        Toxicities:  Fatigue Grade 0= None  Constipation Grade 0= None  Diarrhea  Grade 0= None  Dysuria on urination Grade 0= None  Urgency on urination Grade 1= Present  Frequency on urinationGrade 1= Present  Urine stream strength Moderate - Strong  Urine Incontinence Grade 0= None  Nocturia Grade 0= None      Nursing Note:  Pt seen for OTV with Dr. Dameon Carmona. Pt on flomax. Wt Readings from Last 6 Encounters:  09/29/22 : 106.1 kg (234 lb)  09/09/22 : 105.2 kg (232 lb)  12/08/20 : 103 kg (227 lb)      Jose Francisco Lopez, AJIT    Physician Note:  Subjective:  Doing well, no issues or c/o. Has had no side effects whatsoever thus far. Objective:  Unchanged      Treatment setup imaging have been reviewed:   Yes    Assessment/Plan:    Continue radiotherapy per plan    Next visit:  1 week    Dr. Vickie Whelan

## 2022-10-01 ENCOUNTER — APPOINTMENT (OUTPATIENT)
Dept: RADIATION ONCOLOGY | Facility: HOSPITAL | Age: 62
End: 2022-10-01
Attending: RADIOLOGY
Payer: MEDICARE

## 2022-10-03 ENCOUNTER — OFFICE VISIT (OUTPATIENT)
Dept: RADIATION ONCOLOGY | Facility: HOSPITAL | Age: 62
End: 2022-10-03
Attending: RADIOLOGY
Payer: MEDICARE

## 2022-10-03 VITALS
DIASTOLIC BLOOD PRESSURE: 67 MMHG | SYSTOLIC BLOOD PRESSURE: 122 MMHG | OXYGEN SATURATION: 98 % | TEMPERATURE: 98 F | RESPIRATION RATE: 18 BRPM | HEART RATE: 84 BPM

## 2022-10-03 PROCEDURE — 77385 HC IMRT SIMPLE: CPT | Performed by: RADIOLOGY

## 2022-10-04 PROCEDURE — 77385 HC IMRT SIMPLE: CPT | Performed by: RADIOLOGY

## 2022-10-05 PROCEDURE — 77385 HC IMRT SIMPLE: CPT | Performed by: RADIOLOGY

## 2022-10-07 NOTE — PROGRESS NOTES
[facility] Radiation Treatment Management Note 11-15    Patient:  Chay Andrade  Age:  57year old  Visit Diagnosis:  No diagnosis found. Primary Rad/Onc:  Dr. Faustino Flores    Site Delivered Dose (cGy) Prescribed Dose (cGy) Fraction #   Prostate 2750 7000 11/28           First treatment date:  9/26/2022  Concurrent chemotherapy:  None    Oncology Vitals 9/9/2022 9/29/2022 10/3/2022   Height - - -   Height - - -   Weight 232 lb 234 lb -   Weight 105.235 kg 106.142 kg -   BSA (m2) 2.25 m2 2.25 m2 -   /62 - 122/67   Pulse 84 - 84   Resp 18 - 18   Temp 97.6 - 97.8   SpO2 98 - 98   Pain Score 0 - 0   Some recent data might be hidden        Toxicities:  Fatigue Grade 0= None  Constipation Grade 0= None  Diarrhea  Grade 0= None  Dysuria on urination Grade 0= None  Urgency on urination Grade 1= Present  Frequency on urinationGrade 1= Present  Urine stream strength Moderate   Urine Incontinence Grade 0= None  Nocturia Grade 1= Present ~3x/noc      Nursing Note:  Pt seen for OTV with Dr. Benn Aase. Pt on flomax daily. Wt Readings from Last 6 Encounters:  10/10/22 : 107 kg (236 lb)  09/29/22 : 106.1 kg (234 lb)  09/09/22 : 105.2 kg (232 lb)  12/08/20 : 103 kg (227 lb)    Rafaela ABBASI RN    DIAGNOSIS :  Stage II T1c N0 M0 , G3+4, carcinoma of the prostate status post biopsy pretreatment PSA max 5.49 for definitive treatment    Physician Note:  Subjective:    Mild increasing in LUTS  On Flomax every day        Objective:    NAD  No new findings        Treatment setup imaging have been reviewed:   Yes    Assessment/Plan:    Doing well no issues,     Mild LUTS, continue Flomax, titrate to BID if needed    Reviewed potential side effects  On Flomax  Nocturia x 0-1 at baseline      Continue radiotherapy per plan    Next visit:  1 week    Dr. Faustino Flores

## 2022-10-10 ENCOUNTER — OFFICE VISIT (OUTPATIENT)
Dept: RADIATION ONCOLOGY | Facility: HOSPITAL | Age: 62
End: 2022-10-10
Attending: RADIOLOGY
Payer: MEDICARE

## 2022-10-10 VITALS
SYSTOLIC BLOOD PRESSURE: 121 MMHG | TEMPERATURE: 97 F | WEIGHT: 236 LBS | HEART RATE: 89 BPM | BODY MASS INDEX: 33 KG/M2 | OXYGEN SATURATION: 97 % | RESPIRATION RATE: 18 BRPM | DIASTOLIC BLOOD PRESSURE: 68 MMHG

## 2022-10-14 NOTE — PROGRESS NOTES
[facility] Radiation Treatment Management Note 16-20    Patient:  Chay Andrade  Age:  57year old  Visit Diagnosis:  No diagnosis found. Primary Rad/Onc:  Dr. Pedro Beltran    Site Delivered Dose (cGy) Prescribed Dose (cGy) Fraction #   Prostate 4000 7000 16/28           First treatment date:  9/26/2022  Concurrent chemotherapy:  None    Oncology Vitals 9/29/2022 10/3/2022 10/10/2022   Weight 234 lb - 236 lb   Weight 106.142 kg - 107.049 kg   BSA (m2) 2.25 m2 - 2.26 m2   BP - 122/67 121/68   Pulse - 84 89   Resp - 18 18   Temp - 97.8 97.2   SpO2 - 98 97   Pain Score - 0 0   Some recent data might be hidden        Toxicities:  Fatigue Grade 0= None  Constipation Grade 0= None  Diarrhea  Grade 0= None  Dysuria on urination Grade 0= None  Urgency on urination Grade 1= Present  Frequency on urinationGrade 1= Present  Urine stream strength Moderate  Urine Incontinence Grade 0= None  Nocturia Grade 1= Present ~2-3x/noc      Nursing Note:  Pt seen for OTV with Dr. Fabricio Gibbs. Pt on flomax daily, to increase to BID. Wt Readings from Last 6 Encounters:  10/17/22 : 107.5 kg (237 lb)  10/10/22 : 107 kg (236 lb)  09/29/22 : 106.1 kg (234 lb)  09/09/22 : 105.2 kg (232 lb)  12/08/20 : 103 kg (227 lb)      Elvia Jarquin RN      Physician Note:  Subjective: On Flomax BID with improvement in LUTS        Objective:    NAD  No new findings        Treatment setup imaging have been reviewed:   Yes    Assessment/Plan:    Doing well on Flomax, BID     Reviewed potential side effects  On Flomax BID  Nocturia x 0-1 at baseline      Continue radiotherapy per plan    Next visit:  1 week    Dr. Mayra Batres foot pain/injury

## 2022-10-17 ENCOUNTER — OFFICE VISIT (OUTPATIENT)
Dept: RADIATION ONCOLOGY | Facility: HOSPITAL | Age: 62
End: 2022-10-17
Attending: RADIOLOGY
Payer: MEDICARE

## 2022-10-17 VITALS
TEMPERATURE: 97 F | WEIGHT: 237 LBS | OXYGEN SATURATION: 98 % | HEART RATE: 74 BPM | SYSTOLIC BLOOD PRESSURE: 135 MMHG | DIASTOLIC BLOOD PRESSURE: 72 MMHG | RESPIRATION RATE: 16 BRPM | BODY MASS INDEX: 33 KG/M2

## 2022-10-17 DIAGNOSIS — C61 PROSTATE CANCER (HCC): Primary | ICD-10-CM

## 2022-10-17 RX ORDER — TAMSULOSIN HYDROCHLORIDE 0.4 MG/1
0.4 CAPSULE ORAL 2 TIMES DAILY
Qty: 60 CAPSULE | Refills: 1 | Status: SHIPPED | OUTPATIENT
Start: 2022-10-17 | End: 2022-12-16

## 2022-10-20 PROCEDURE — 77385 HC IMRT SIMPLE: CPT | Performed by: RADIOLOGY

## 2022-10-21 PROCEDURE — 77336 RADIATION PHYSICS CONSULT: CPT | Performed by: RADIOLOGY

## 2022-10-21 PROCEDURE — 77385 HC IMRT SIMPLE: CPT | Performed by: RADIOLOGY

## 2022-10-21 NOTE — PROGRESS NOTES
[facility] Radiation Treatment Management Note 21-25    Patient:  Chay Andrade  Age:  57year old  Visit Diagnosis:    1. Prostate cancer [facility])      Primary Rad/Onc:  Dr. Ranulfo Mcghee     Site Delivered Dose (cGy) Prescribed Dose (cGy) Fraction #   Prostate 3054 7000 21/28           First treatment date:  9/26/2022  Concurrent chemotherapy: None    Oncology Vitals 10/3/2022 10/10/2022 10/17/2022   Weight - 236 lb 237 lb   Weight - 107.049 kg 107.502 kg   BSA (m2) - 2.26 m2 2.27 m2   /67 121/68 135/72   Pulse 84 89 74   Resp 18 18 16   Temp 97.8 97.2 97.3   SpO2 98 97 98   Pain Score 0 0 0   Some recent data might be hidden        Toxicities:  Fatigue Grade 0= None  Constipation Grade 0= None  Diarrhea  Grade 0= None  Dysuria on urination Grade 0= None  Urgency on urination Grade 0= None  Frequency on urinationGrade 1= Present  Urine stream strength Moderate  Urine Incontinence Grade 0= None  Nocturia Grade 1= Present. X 2-3 nightly on 2 flomax daily. Nursing Note:  Wt Readings from Last 6 Encounters:  10/24/22 : 107.4 kg (236 lb 12.8 oz)  10/17/22 : 107.5 kg (237 lb)  10/10/22 : 107 kg (236 lb)  09/29/22 : 106.1 kg (234 lb)  09/09/22 : 105.2 kg (232 lb)  12/08/20 : 103 kg (227 lb)    No new meds. Eating and drinking well. Dr. Schulte Friday to see pt. Teresita Schlatter, RN    Physician Note:  Subjective:  Doing reasonably well overall. Urinary issues improved on FLomax BID, though he was unable to get more pills at this point. No fatigue, no bowel issues. Normal appetite. Objective:  Unchanged      Treatment setup imaging have been reviewed:   Yes    Assessment/Plan:    Continue radiotherapy per plan    Next visit:  1 week    Dr. Xuan Breen

## 2022-10-24 ENCOUNTER — OFFICE VISIT (OUTPATIENT)
Dept: RADIATION ONCOLOGY | Facility: HOSPITAL | Age: 62
End: 2022-10-24
Attending: RADIOLOGY
Payer: MEDICARE

## 2022-10-24 VITALS
HEART RATE: 67 BPM | RESPIRATION RATE: 16 BRPM | BODY MASS INDEX: 33 KG/M2 | TEMPERATURE: 98 F | DIASTOLIC BLOOD PRESSURE: 68 MMHG | SYSTOLIC BLOOD PRESSURE: 134 MMHG | WEIGHT: 236.81 LBS | OXYGEN SATURATION: 98 %

## 2022-10-24 DIAGNOSIS — C61 PROSTATE CANCER (HCC): Primary | ICD-10-CM

## 2022-10-24 PROCEDURE — 77385 HC IMRT SIMPLE: CPT | Performed by: RADIOLOGY

## 2022-10-25 PROCEDURE — 77385 HC IMRT SIMPLE: CPT | Performed by: RADIOLOGY

## 2022-10-26 PROCEDURE — 77385 HC IMRT SIMPLE: CPT | Performed by: RADIOLOGY

## 2022-10-27 PROCEDURE — 77385 HC IMRT SIMPLE: CPT | Performed by: RADIOLOGY

## 2022-10-28 PROCEDURE — 77336 RADIATION PHYSICS CONSULT: CPT | Performed by: RADIOLOGY

## 2022-10-28 PROCEDURE — 77385 HC IMRT SIMPLE: CPT | Performed by: RADIOLOGY

## 2022-10-28 NOTE — PROGRESS NOTES
[facility] Radiation Treatment Management Note 26-30    Patient:  Michaela Harrison  Age:  57year old  Visit Diagnosis:  No diagnosis found. Primary Rad/Onc:  Dr. Ariadna Kauffman    Site Delivered Dose (cGy) Prescribed Dose (cGy) Fraction #   Prostate  6370 3133 56/12           First treatment date:  9/26/2022  Concurrent chemotherapy: None    Oncology Vitals 10/10/2022 10/17/2022 10/24/2022   Weight 236 lb 237 lb 236 lb 12.8 oz   Weight 107.049 kg 107.502 kg 107.412 kg   BSA (m2) 2.26 m2 2.27 m2 2.27 m2   /68 135/72 134/68   Pulse 89 74 67   Resp 18 16 16   Temp 97.2 97.3 97.9   SpO2 97 98 98   Pain Score 0 0 0   Some recent data might be hidden        Toxicities:  Fatigue Grade 0= None  Constipation Grade 0= None  Diarrhea  Grade 0= None  Dysuria on urination Grade 1, to take Tylenol. Urgency on urination Grade 0= None  Frequency on urinationGrade 1= Present  Urine stream strength Moderate  Urine Incontinence Grade 0= None  Nocturia Grade 1= Present. X 3-4 nightly on 2 flomax daily. Nursing Note:  Wt Readings from Last 6 Encounters:  10/31/22 : 106.6 kg (235 lb)  10/24/22 : 107.4 kg (236 lb 12.8 oz)  10/17/22 : 107.5 kg (237 lb)  10/10/22 : 107 kg (236 lb)  09/29/22 : 106.1 kg (234 lb)  09/09/22 : 105.2 kg (232 lb)    Eating and drinking well. Dr. Krissy Murray to see pt. Ramesh Patten RN    DIAGNOSIS :  Stage II T1c N0 M0 , G3+4, carcinoma of the prostate status post biopsy pretreatment PSA max 5.49 for definitive treatment      Physician Note:  Subjective: Increased nocturia as above,  On Flomax BID with improvement in LUTS        Objective:    NAD  No new findings        Treatment setup imaging have been reviewed: Yes    Assessment/Plan:    Doing well on Flomax, BID , to finish shortly.     Reviewed potential side effects  Nocturia x 0-1 at baseline      Continue radiotherapy per plan    Next visit:  1 week    Dr. Ariadna Kauffman

## 2022-10-31 ENCOUNTER — OFFICE VISIT (OUTPATIENT)
Dept: RADIATION ONCOLOGY | Facility: HOSPITAL | Age: 62
End: 2022-10-31
Attending: RADIOLOGY
Payer: MEDICARE

## 2022-10-31 VITALS
RESPIRATION RATE: 16 BRPM | DIASTOLIC BLOOD PRESSURE: 78 MMHG | BODY MASS INDEX: 33 KG/M2 | WEIGHT: 235 LBS | SYSTOLIC BLOOD PRESSURE: 125 MMHG | OXYGEN SATURATION: 96 % | HEART RATE: 74 BPM | TEMPERATURE: 98 F

## 2022-10-31 DIAGNOSIS — C61 PROSTATE CANCER (HCC): Primary | ICD-10-CM

## 2022-10-31 PROCEDURE — 77385 HC IMRT SIMPLE: CPT | Performed by: RADIOLOGY

## 2022-10-31 NOTE — PATIENT INSTRUCTIONS
Follow up with Dr. Tunde Zambrano in 3 months. Yoni Devries will call you to schedule your follow up appointment. Please call [phone] with any radiation questions. 1 week prior to PSA blood test, please refrain from bike riding, sexual activity and no prostate exams. and Schedule your PSA prior to your follow up. Call [phone] to schedule. Follow up with Dr. Madina Berger in 3 months. Call her office to schedule.

## 2022-11-01 ENCOUNTER — APPOINTMENT (OUTPATIENT)
Dept: RADIATION ONCOLOGY | Facility: HOSPITAL | Age: 62
End: 2022-11-01
Attending: RADIOLOGY
Payer: MEDICARE

## 2022-11-01 PROCEDURE — 77385 HC IMRT SIMPLE: CPT | Performed by: RADIOLOGY

## 2022-11-02 PROCEDURE — 77385 HC IMRT SIMPLE: CPT | Performed by: RADIOLOGY

## 2022-11-15 ENCOUNTER — IMMUNIZATION (OUTPATIENT)
Dept: LAB | Age: 62
End: 2022-11-15
Attending: EMERGENCY MEDICINE
Payer: MEDICARE

## 2022-11-15 DIAGNOSIS — Z23 NEED FOR VACCINATION: Primary | ICD-10-CM

## 2022-11-15 PROCEDURE — 0134A SARSCOV2 VAC BVL 50MCG/0.5ML: CPT

## 2022-12-07 RX ORDER — TAMSULOSIN HYDROCHLORIDE 0.4 MG/1
0.4 CAPSULE ORAL 2 TIMES DAILY
Qty: 60 CAPSULE | Refills: 1 | Status: SHIPPED | OUTPATIENT
Start: 2022-12-07 | End: 2023-02-05

## 2023-01-05 ENCOUNTER — TELEPHONE (OUTPATIENT)
Dept: RADIATION ONCOLOGY | Facility: HOSPITAL | Age: 63
End: 2023-01-05

## 2023-01-05 RX ORDER — TAMSULOSIN HYDROCHLORIDE 0.4 MG/1
0.4 CAPSULE ORAL DAILY
Qty: 60 CAPSULE | Refills: 1 | Status: SHIPPED | OUTPATIENT
Start: 2023-01-05 | End: 2023-01-09

## 2023-01-05 NOTE — TELEPHONE ENCOUNTER
[facility]  Pharmacy  Request for a Refill or New Prescription and Authorization    [facility]  RX#  [id number]    Medication  Tamsulosin  HCL 0.4mg Capsule  Quantity   60  1 Refill

## 2023-01-09 RX ORDER — TAMSULOSIN HYDROCHLORIDE 0.4 MG/1
0.4 CAPSULE ORAL 2 TIMES DAILY
Qty: 60 CAPSULE | Refills: 2 | Status: SHIPPED | OUTPATIENT
Start: 2023-01-09 | End: 2023-04-09

## 2023-02-01 ENCOUNTER — LAB ENCOUNTER (OUTPATIENT)
Dept: LAB | Facility: HOSPITAL | Age: 63
End: 2023-02-01
Attending: RADIOLOGY
Payer: MEDICARE

## 2023-02-01 DIAGNOSIS — C61 PROSTATE CANCER (HCC): ICD-10-CM

## 2023-02-01 LAB — PSA SERPL-MCNC: 1.95 NG/ML (ref ?–4)

## 2023-02-01 PROCEDURE — 36415 COLL VENOUS BLD VENIPUNCTURE: CPT

## 2023-02-01 PROCEDURE — 84153 ASSAY OF PSA TOTAL: CPT

## 2023-02-10 ENCOUNTER — OFFICE VISIT (OUTPATIENT)
Dept: RADIATION ONCOLOGY | Facility: HOSPITAL | Age: 63
End: 2023-02-10
Attending: RADIOLOGY
Payer: MEDICARE

## 2023-02-10 VITALS
BODY MASS INDEX: 33 KG/M2 | DIASTOLIC BLOOD PRESSURE: 73 MMHG | HEART RATE: 88 BPM | RESPIRATION RATE: 16 BRPM | TEMPERATURE: 97 F | WEIGHT: 236.38 LBS | OXYGEN SATURATION: 98 % | SYSTOLIC BLOOD PRESSURE: 138 MMHG

## 2023-02-10 PROCEDURE — 99211 OFF/OP EST MAY X REQ PHY/QHP: CPT

## 2023-02-10 RX ORDER — TAMSULOSIN HYDROCHLORIDE 0.4 MG/1
0.4 CAPSULE ORAL 2 TIMES DAILY
Qty: 60 CAPSULE | Refills: 3 | Status: SHIPPED | OUTPATIENT
Start: 2023-02-10 | End: 2023-03-12

## 2023-02-10 NOTE — PATIENT INSTRUCTIONS
Follow up with Dr. Wily Funk in 9 months. Evgeny Thomas will call you to schedule your follow up appointment. Please call [phone] with any radiation questions. Schedule a follow up with Dr. Lia Thomas for 6 months, have PSA drawn prior to Dr. Lia Thomas appointment. New PSA order in system.

## 2023-03-15 ENCOUNTER — OFFICE VISIT (OUTPATIENT)
Dept: INTERNAL MEDICINE CLINIC | Facility: CLINIC | Age: 63
End: 2023-03-15

## 2023-03-15 VITALS
DIASTOLIC BLOOD PRESSURE: 80 MMHG | HEIGHT: 71 IN | HEART RATE: 76 BPM | BODY MASS INDEX: 32.62 KG/M2 | OXYGEN SATURATION: 98 % | SYSTOLIC BLOOD PRESSURE: 120 MMHG | WEIGHT: 233 LBS | RESPIRATION RATE: 14 BRPM

## 2023-03-15 DIAGNOSIS — I10 ESSENTIAL HYPERTENSION: Primary | Chronic | ICD-10-CM

## 2023-03-15 DIAGNOSIS — C61 PROSTATE CANCER (HCC): ICD-10-CM

## 2023-03-15 DIAGNOSIS — E78.5 HYPERLIPIDEMIA, UNSPECIFIED HYPERLIPIDEMIA TYPE: Chronic | ICD-10-CM

## 2023-03-15 DIAGNOSIS — R09.81 CHRONIC NASAL CONGESTION: ICD-10-CM

## 2023-03-15 DIAGNOSIS — M43.16 SPONDYLOLISTHESIS, LUMBAR REGION: ICD-10-CM

## 2023-03-15 PROCEDURE — 99204 OFFICE O/P NEW MOD 45 MIN: CPT | Performed by: INTERNAL MEDICINE

## 2023-03-15 PROCEDURE — 3074F SYST BP LT 130 MM HG: CPT | Performed by: INTERNAL MEDICINE

## 2023-03-15 PROCEDURE — 3079F DIAST BP 80-89 MM HG: CPT | Performed by: INTERNAL MEDICINE

## 2023-03-15 PROCEDURE — 3008F BODY MASS INDEX DOCD: CPT | Performed by: INTERNAL MEDICINE

## 2023-03-15 RX ORDER — TAMSULOSIN HYDROCHLORIDE 0.4 MG/1
0.4 CAPSULE ORAL 2 TIMES DAILY
COMMUNITY
End: 2023-03-15

## 2023-03-15 RX ORDER — OXYMETAZOLINE HYDROCHLORIDE 0.05 G/100ML
1 SPRAY NASAL AS NEEDED
COMMUNITY

## 2023-03-15 RX ORDER — ATORVASTATIN CALCIUM 10 MG/1
10 TABLET, FILM COATED ORAL EVERY OTHER DAY
Qty: 45 TABLET | Refills: 2 | Status: SHIPPED | OUTPATIENT
Start: 2023-03-15 | End: 2023-06-13

## 2023-03-15 RX ORDER — BENAZEPRIL HYDROCHLORIDE AND HYDROCHLOROTHIAZIDE 20; 12.5 MG/1; MG/1
1 TABLET ORAL DAILY
Qty: 90 TABLET | Refills: 2 | Status: SHIPPED | OUTPATIENT
Start: 2023-03-15

## 2023-03-15 RX ORDER — AMLODIPINE BESYLATE 10 MG/1
10 TABLET ORAL DAILY
Qty: 90 TABLET | Refills: 3 | Status: SHIPPED | OUTPATIENT
Start: 2023-03-15

## 2023-03-15 RX ORDER — TAMSULOSIN HYDROCHLORIDE 0.4 MG/1
0.4 CAPSULE ORAL 2 TIMES DAILY
Qty: 180 CAPSULE | Refills: 1 | Status: SHIPPED | OUTPATIENT
Start: 2023-03-15 | End: 2023-06-13

## 2023-03-16 ENCOUNTER — LAB ENCOUNTER (OUTPATIENT)
Dept: LAB | Facility: HOSPITAL | Age: 63
End: 2023-03-16
Attending: INTERNAL MEDICINE
Payer: MEDICARE

## 2023-03-16 DIAGNOSIS — E78.5 HYPERLIPIDEMIA, UNSPECIFIED HYPERLIPIDEMIA TYPE: Chronic | ICD-10-CM

## 2023-03-16 DIAGNOSIS — I10 ESSENTIAL HYPERTENSION: Chronic | ICD-10-CM

## 2023-03-16 LAB
ALBUMIN SERPL-MCNC: 3.9 G/DL (ref 3.4–5)
ALBUMIN/GLOB SERPL: 1.1 {RATIO} (ref 1–2)
ALP LIVER SERPL-CCNC: 62 U/L
ALT SERPL-CCNC: 43 U/L
ANION GAP SERPL CALC-SCNC: 5 MMOL/L (ref 0–18)
AST SERPL-CCNC: 10 U/L (ref 15–37)
BILIRUB SERPL-MCNC: 0.8 MG/DL (ref 0.1–2)
BUN BLD-MCNC: 16 MG/DL (ref 7–18)
BUN/CREAT SERPL: 12.7 (ref 10–20)
CALCIUM BLD-MCNC: 9.4 MG/DL (ref 8.5–10.1)
CHLORIDE SERPL-SCNC: 109 MMOL/L (ref 98–112)
CHOLEST SERPL-MCNC: 151 MG/DL (ref ?–200)
CO2 SERPL-SCNC: 30 MMOL/L (ref 21–32)
CREAT BLD-MCNC: 1.26 MG/DL
DEPRECATED RDW RBC AUTO: 42 FL (ref 35.1–46.3)
ERYTHROCYTE [DISTWIDTH] IN BLOOD BY AUTOMATED COUNT: 12.5 % (ref 11–15)
FASTING PATIENT LIPID ANSWER: YES
FASTING STATUS PATIENT QL REPORTED: YES
GFR SERPLBLD BASED ON 1.73 SQ M-ARVRAT: 63 ML/MIN/1.73M2 (ref 60–?)
GLOBULIN PLAS-MCNC: 3.4 G/DL (ref 2.8–4.4)
GLUCOSE BLD-MCNC: 120 MG/DL (ref 70–99)
HCT VFR BLD AUTO: 44.1 %
HDLC SERPL-MCNC: 63 MG/DL (ref 40–59)
HGB BLD-MCNC: 14.7 G/DL
LDLC SERPL CALC-MCNC: 76 MG/DL (ref ?–100)
MCH RBC QN AUTO: 30.5 PG (ref 26–34)
MCHC RBC AUTO-ENTMCNC: 33.3 G/DL (ref 31–37)
MCV RBC AUTO: 91.5 FL
NONHDLC SERPL-MCNC: 88 MG/DL (ref ?–130)
OSMOLALITY SERPL CALC.SUM OF ELEC: 300 MOSM/KG (ref 275–295)
PLATELET # BLD AUTO: 208 10(3)UL (ref 150–450)
POTASSIUM SERPL-SCNC: 3.5 MMOL/L (ref 3.5–5.1)
PROT SERPL-MCNC: 7.3 G/DL (ref 6.4–8.2)
RBC # BLD AUTO: 4.82 X10(6)UL
SODIUM SERPL-SCNC: 144 MMOL/L (ref 136–145)
TRIGL SERPL-MCNC: 59 MG/DL (ref 30–149)
TSI SER-ACNC: 1.33 MIU/ML (ref 0.36–3.74)
VLDLC SERPL CALC-MCNC: 9 MG/DL (ref 0–30)
WBC # BLD AUTO: 3.2 X10(3) UL (ref 4–11)

## 2023-03-16 PROCEDURE — 80053 COMPREHEN METABOLIC PANEL: CPT

## 2023-03-16 PROCEDURE — 84443 ASSAY THYROID STIM HORMONE: CPT

## 2023-03-16 PROCEDURE — 85027 COMPLETE CBC AUTOMATED: CPT

## 2023-03-16 PROCEDURE — 36415 COLL VENOUS BLD VENIPUNCTURE: CPT

## 2023-03-16 PROCEDURE — 80061 LIPID PANEL: CPT

## 2023-04-06 ENCOUNTER — OFFICE VISIT (OUTPATIENT)
Dept: OTOLARYNGOLOGY | Facility: CLINIC | Age: 63
End: 2023-04-06

## 2023-04-06 VITALS — HEIGHT: 71 IN | WEIGHT: 233 LBS | BODY MASS INDEX: 32.62 KG/M2

## 2023-04-06 DIAGNOSIS — J34.3 NASAL TURBINATE HYPERTROPHY: ICD-10-CM

## 2023-04-06 DIAGNOSIS — T48.5X5A RHINITIS MEDICAMENTOSA: Primary | ICD-10-CM

## 2023-04-06 DIAGNOSIS — J34.89 NASAL CRUSTING: ICD-10-CM

## 2023-04-06 DIAGNOSIS — R06.83 SNORING: ICD-10-CM

## 2023-04-06 DIAGNOSIS — J31.0 RHINITIS MEDICAMENTOSA: Primary | ICD-10-CM

## 2023-04-06 PROCEDURE — 99203 OFFICE O/P NEW LOW 30 MIN: CPT | Performed by: SPECIALIST

## 2023-04-06 PROCEDURE — 3008F BODY MASS INDEX DOCD: CPT | Performed by: SPECIALIST

## 2023-04-06 RX ORDER — PREDNISONE 20 MG/1
20 TABLET ORAL DAILY
Qty: 3 TABLET | Refills: 0 | Status: SHIPPED | OUTPATIENT
Start: 2023-04-06

## 2023-04-06 RX ORDER — AZELASTINE 1 MG/ML
2 SPRAY, METERED NASAL 2 TIMES DAILY
Qty: 30 ML | Refills: 5 | Status: SHIPPED | OUTPATIENT
Start: 2023-04-06

## 2023-04-06 RX ORDER — FLUTICASONE PROPIONATE 50 MCG
1 SPRAY, SUSPENSION (ML) NASAL 2 TIMES DAILY
Qty: 16 G | Refills: 3 | Status: SHIPPED | OUTPATIENT
Start: 2023-04-06

## 2023-04-06 NOTE — PATIENT INSTRUCTIONS
You have rhinitis medicamentosa secondary to overusage of nasal steroid spray. I placed you on 3 days of prednisone as well as Astelin nasal spray 2 sprays each nostril twice daily and Flonase nasal spray 1 spray to each nostril twice daily. Follow-up in 3 weeks time, sooner if problems.

## 2023-04-27 ENCOUNTER — OFFICE VISIT (OUTPATIENT)
Dept: OTOLARYNGOLOGY | Facility: CLINIC | Age: 63
End: 2023-04-27

## 2023-04-27 VITALS — WEIGHT: 233 LBS | BODY MASS INDEX: 33 KG/M2

## 2023-04-27 DIAGNOSIS — J31.0 RHINITIS MEDICAMENTOSA: Primary | ICD-10-CM

## 2023-04-27 DIAGNOSIS — T48.5X5A RHINITIS MEDICAMENTOSA: Primary | ICD-10-CM

## 2023-04-27 DIAGNOSIS — J34.89 NASAL CRUSTING: ICD-10-CM

## 2023-04-27 DIAGNOSIS — J34.3 NASAL TURBINATE HYPERTROPHY: ICD-10-CM

## 2023-04-27 PROCEDURE — 99213 OFFICE O/P EST LOW 20 MIN: CPT | Performed by: SPECIALIST

## 2023-04-27 RX ORDER — MONTELUKAST SODIUM 10 MG/1
10 TABLET ORAL NIGHTLY
Qty: 30 TABLET | Refills: 3 | Status: SHIPPED | OUTPATIENT
Start: 2023-04-27

## 2023-04-27 NOTE — PATIENT INSTRUCTIONS
I added Singulair to your Astelin and Flonase. Please call me in 1 week's time, we will get a CT of the sinuses if not resolved.

## 2023-05-04 ENCOUNTER — PATIENT MESSAGE (OUTPATIENT)
Dept: OTOLARYNGOLOGY | Facility: CLINIC | Age: 63
End: 2023-05-04

## 2023-05-19 NOTE — TELEPHONE ENCOUNTER
From: Chay Andrade  To: Zeus Davila MD  Sent: 5/4/2023 10:19 AM CDT  Subject: Nasal congestion    Hello Dr. Pam Naylor,   I completed the 7 days of the Motelukast Sodidum tablets as you requested. I haven't had any relief and I'm still congested. Please advise me of the next steps. Thank you.

## 2023-05-20 ENCOUNTER — TELEPHONE (OUTPATIENT)
Dept: OTOLARYNGOLOGY | Facility: CLINIC | Age: 63
End: 2023-05-20

## 2023-05-20 DIAGNOSIS — J34.89 NASAL OBSTRUCTION: ICD-10-CM

## 2023-05-20 DIAGNOSIS — J34.89 SINUS PRESSURE: Primary | ICD-10-CM

## 2023-05-21 NOTE — TELEPHONE ENCOUNTER
Regarding: Nasal congestion  Contact: [phone]  ----- Message from Darlyn Chacon RN sent at 5/19/2023 11:53 AM CDT -----       ----- Message sent from Marylin Lemus RN to Osbaldo Cook at 5/19/2023 11:53 AM -----   Malick Cartwright   So sorry we did not respond sooner, I did forward your message to Alexander Knight regarding the next steps. She is out of the office until Monday. Thank You  Triage RN       ----- Message -----       From:Chay Andrade       Sent:5/16/2023  9:52 AM CDT         To:Patient Medical Advice Request Message List    Subject:Nasal congestion    Hello Dr. Giuliano Lala,  I sent you a message on May 4th asking what my next steps are. At my last visit you mentioned possibly a CT scan after I complete 7 days of Motelukast Sodium tablets if no relief. As of today I'm still congested and using the Fluticasone and Azelastine sprays as directed. The system states allow 5-7 business days for reply from you, I waited. Please advise me of my next steps. Thank you      ----- Message -----       From:Chay Andrade       Sent:5/4/2023 10:19 AM CDT         Jennifer Canchola MD    Subject:Nasal congestion    Hello Dr. Giuliano Lala,   I completed the 7 days of the Motelukast Sodidum tablets as you requested. I haven't had any relief and I'm still congested. Please advise me of the next steps. Thank you.

## 2023-05-22 ENCOUNTER — TELEPHONE (OUTPATIENT)
Dept: OTOLARYNGOLOGY | Facility: CLINIC | Age: 63
End: 2023-05-22

## 2023-05-24 ENCOUNTER — TELEPHONE (OUTPATIENT)
Dept: OTOLARYNGOLOGY | Facility: CLINIC | Age: 63
End: 2023-05-24

## 2023-05-24 NOTE — TELEPHONE ENCOUNTER
Patient here in the office, he is concerned that he has not heard from us or his insurance regarding authorization for his CT scan. Explained to the patient all information has been submitted to his insurance, the managed care department will reach out a day or two before the test with authorization. Patient understanding to information.

## 2023-05-26 NOTE — TELEPHONE ENCOUNTER
Patient calling for update on prior authorization for ct scheduled. Please call at [phone],[email or web address].

## 2023-05-30 NOTE — TELEPHONE ENCOUNTER
Advised pt that prior auth not approved and pt will need to be rescheduled , pt transferred to Central scheduling.

## 2023-05-31 ENCOUNTER — IMMUNIZATION (OUTPATIENT)
Dept: LAB | Age: 63
End: 2023-05-31
Attending: EMERGENCY MEDICINE
Payer: MEDICARE

## 2023-05-31 DIAGNOSIS — Z23 NEED FOR VACCINATION: Primary | ICD-10-CM

## 2023-05-31 PROCEDURE — 0134A SARSCOV2 VAC BVL 50MCG/0.5ML: CPT

## 2023-06-01 ENCOUNTER — HOSPITAL ENCOUNTER (OUTPATIENT)
Dept: CT IMAGING | Facility: HOSPITAL | Age: 63
Discharge: HOME | End: 2023-06-01
Attending: SPECIALIST
Payer: MEDICARE

## 2023-06-01 DIAGNOSIS — J34.89 SINUS PRESSURE: ICD-10-CM

## 2023-06-01 DIAGNOSIS — J34.89 NASAL OBSTRUCTION: ICD-10-CM

## 2023-06-01 PROCEDURE — 70486 CT MAXILLOFACIAL W/O DYE: CPT | Performed by: SPECIALIST

## 2023-06-02 ENCOUNTER — TELEPHONE (OUTPATIENT)
Dept: OTOLARYNGOLOGY | Facility: CLINIC | Age: 63
End: 2023-06-02

## 2023-06-02 NOTE — TELEPHONE ENCOUNTER
Patient requesting call back with CT results. Would like to talk to a nurse instead of through [address]  Please advise

## 2023-06-15 ENCOUNTER — OFFICE VISIT (OUTPATIENT)
Facility: CLINIC | Age: 63
End: 2023-06-15

## 2023-06-15 ENCOUNTER — LAB ENCOUNTER (OUTPATIENT)
Dept: LAB | Facility: HOSPITAL | Age: 63
End: 2023-06-15
Attending: INTERNAL MEDICINE
Payer: MEDICARE

## 2023-06-15 VITALS
BODY MASS INDEX: 33.04 KG/M2 | DIASTOLIC BLOOD PRESSURE: 80 MMHG | RESPIRATION RATE: 14 BRPM | WEIGHT: 236 LBS | SYSTOLIC BLOOD PRESSURE: 130 MMHG | HEIGHT: 71 IN | HEART RATE: 100 BPM | OXYGEN SATURATION: 95 %

## 2023-06-15 DIAGNOSIS — I10 ESSENTIAL HYPERTENSION: Primary | Chronic | ICD-10-CM

## 2023-06-15 DIAGNOSIS — R73.02 IMPAIRED GLUCOSE TOLERANCE: ICD-10-CM

## 2023-06-15 DIAGNOSIS — C61 PROSTATE CANCER (HCC): ICD-10-CM

## 2023-06-15 DIAGNOSIS — E78.5 HYPERLIPIDEMIA, UNSPECIFIED HYPERLIPIDEMIA TYPE: Chronic | ICD-10-CM

## 2023-06-15 DIAGNOSIS — M43.16 SPONDYLOLISTHESIS, LUMBAR REGION: ICD-10-CM

## 2023-06-15 DIAGNOSIS — M47.12 CERVICAL SPONDYLOSIS WITH MYELOPATHY: ICD-10-CM

## 2023-06-15 DIAGNOSIS — Z23 NEED FOR PNEUMOCOCCAL VACCINATION: ICD-10-CM

## 2023-06-15 LAB
EST. AVERAGE GLUCOSE BLD GHB EST-MCNC: 120 MG/DL (ref 68–126)
HBA1C MFR BLD: 5.8 % (ref ?–5.7)

## 2023-06-15 PROCEDURE — 36415 COLL VENOUS BLD VENIPUNCTURE: CPT | Performed by: INTERNAL MEDICINE

## 2023-06-15 PROCEDURE — 90677 PCV20 VACCINE IM: CPT | Performed by: INTERNAL MEDICINE

## 2023-06-15 PROCEDURE — 3075F SYST BP GE 130 - 139MM HG: CPT | Performed by: INTERNAL MEDICINE

## 2023-06-15 PROCEDURE — 83036 HEMOGLOBIN GLYCOSYLATED A1C: CPT | Performed by: INTERNAL MEDICINE

## 2023-06-15 PROCEDURE — 3079F DIAST BP 80-89 MM HG: CPT | Performed by: INTERNAL MEDICINE

## 2023-06-15 PROCEDURE — 99213 OFFICE O/P EST LOW 20 MIN: CPT | Performed by: INTERNAL MEDICINE

## 2023-06-15 PROCEDURE — 3008F BODY MASS INDEX DOCD: CPT | Performed by: INTERNAL MEDICINE

## 2023-06-15 PROCEDURE — 96160 PT-FOCUSED HLTH RISK ASSMT: CPT | Performed by: INTERNAL MEDICINE

## 2023-06-15 PROCEDURE — G0009 ADMIN PNEUMOCOCCAL VACCINE: HCPCS | Performed by: INTERNAL MEDICINE

## 2023-06-15 RX ORDER — TAMSULOSIN HYDROCHLORIDE 0.4 MG/1
0.4 CAPSULE ORAL 2 TIMES DAILY
COMMUNITY

## 2023-06-15 RX ORDER — ATORVASTATIN CALCIUM 10 MG/1
10 TABLET, FILM COATED ORAL NIGHTLY
COMMUNITY

## 2023-07-18 RX ORDER — MONTELUKAST SODIUM 10 MG/1
10 TABLET ORAL NIGHTLY
Qty: 90 TABLET | Refills: 1 | Status: SHIPPED | OUTPATIENT
Start: 2023-07-18

## 2023-07-18 RX ORDER — MONTELUKAST SODIUM 10 MG/1
10 TABLET ORAL NIGHTLY
Qty: 30 TABLET | Refills: 3 | OUTPATIENT
Start: 2023-07-18

## 2023-08-01 ENCOUNTER — TELEPHONE (OUTPATIENT)
Dept: RADIATION ONCOLOGY | Facility: HOSPITAL | Age: 63
End: 2023-08-01

## 2023-08-02 ENCOUNTER — LAB ENCOUNTER (OUTPATIENT)
Dept: LAB | Facility: HOSPITAL | Age: 63
End: 2023-08-02
Attending: UROLOGY
Payer: MEDICARE

## 2023-08-02 DIAGNOSIS — R97.20 ELEVATED PROSTATE SPECIFIC ANTIGEN (PSA): Primary | ICD-10-CM

## 2023-08-02 LAB — PSA SERPL-MCNC: 0.83 NG/ML (ref ?–4)

## 2023-08-02 PROCEDURE — 36415 COLL VENOUS BLD VENIPUNCTURE: CPT

## 2023-08-02 PROCEDURE — 84153 ASSAY OF PSA TOTAL: CPT

## 2023-08-11 ENCOUNTER — OFFICE VISIT (OUTPATIENT)
Dept: RADIATION ONCOLOGY | Facility: HOSPITAL | Age: 63
End: 2023-08-11
Attending: RADIOLOGY
Payer: MEDICARE

## 2023-08-11 VITALS
RESPIRATION RATE: 16 BRPM | OXYGEN SATURATION: 96 % | SYSTOLIC BLOOD PRESSURE: 119 MMHG | DIASTOLIC BLOOD PRESSURE: 72 MMHG | TEMPERATURE: 98 F | HEART RATE: 72 BPM

## 2023-08-11 DIAGNOSIS — C61 PROSTATE CANCER (HCC): Primary | ICD-10-CM

## 2023-08-11 PROCEDURE — 99211 OFF/OP EST MAY X REQ PHY/QHP: CPT

## 2023-08-11 RX ORDER — FINASTERIDE 5 MG/1
5 TABLET, FILM COATED ORAL DAILY
COMMUNITY
Start: 2023-08-09

## 2023-08-11 NOTE — PROGRESS NOTES
Nursing Follow-Up Note    Patient: Marilia Burgess  YOB: 1960  Age: 70year old  Radiation Oncologist: Dr. Jossy Taylor  Referring Physician: No ref. provider found  Chief Complaint: Patient presents with:  Prostate Cancer    Date: 8/11/2023    Toxicities: Fatigue Grade 0= None  Constipation Grade 0= None  Diarrhea  Grade 0= None  Dysuria on urination Grade 0= None  Urgency on urination Grade 1= Present  Frequency on urinationGrade 1= Present  Urine stream strength Moderate  Urine Incontinence Grade 1= Occasional (e.g., with coughing, sneezing, etc.), pads not indicated  Nocturia Grade 1= Present X 2 nightly- tries limiting fluid intake before bed      Vital Signs: There were no vitals taken for this visit., Wt Readings from Last 6 Encounters:  06/15/23 : 107 kg (236 lb)  04/27/23 : 105.7 kg (233 lb)  04/06/23 : 105.7 kg (233 lb)  03/15/23 : 105.7 kg (233 lb)  02/10/23 : 107.2 kg (236 lb 6.4 oz)  10/31/22 : 106.6 kg (235 lb)      Allergies:  No Known Allergies    Nursing Note: Pt seen for follow up with Dr. Blue Carrillo. Pt completed prostate RT on 11/2/2022. Pt AUA today a 7, ED 1. PSA completed 8/2/2023, Dr. Blue Carrillo to review with patient. Pt remains on Flomax BID and started Finasteride 5mg recently. Patient to follow up with Dr. Blue Carrillo in 9 months, to follow up with Dr. Miriam Pena in 6 months.

## 2023-08-11 NOTE — PATIENT INSTRUCTIONS
Follow up with Dr. Samara Mayes in 9 months. Major Santana will call you to schedule your follow up appointment. Please call [phone] with any radiation questions. 1 week prior to PSA blood test, please refrain from bike riding, sexual activity and no prostate exams. Follow up with Dr. Jay Mckee in 6 months.

## 2023-10-17 ENCOUNTER — OFFICE VISIT (OUTPATIENT)
Facility: CLINIC | Age: 63
End: 2023-10-17

## 2023-10-17 ENCOUNTER — LAB ENCOUNTER (OUTPATIENT)
Dept: LAB | Facility: HOSPITAL | Age: 63
End: 2023-10-17
Attending: INTERNAL MEDICINE
Payer: MEDICARE

## 2023-10-17 VITALS
BODY MASS INDEX: 28.98 KG/M2 | OXYGEN SATURATION: 98 % | RESPIRATION RATE: 14 BRPM | SYSTOLIC BLOOD PRESSURE: 120 MMHG | HEIGHT: 71 IN | HEART RATE: 80 BPM | WEIGHT: 207 LBS | DIASTOLIC BLOOD PRESSURE: 80 MMHG

## 2023-10-17 DIAGNOSIS — E78.5 HYPERLIPIDEMIA, UNSPECIFIED HYPERLIPIDEMIA TYPE: Chronic | ICD-10-CM

## 2023-10-17 DIAGNOSIS — R07.89 ATYPICAL CHEST PAIN: ICD-10-CM

## 2023-10-17 DIAGNOSIS — I10 ESSENTIAL HYPERTENSION: Primary | Chronic | ICD-10-CM

## 2023-10-17 LAB
ATRIAL RATE: 70 BPM
P AXIS: 58 DEGREES
P-R INTERVAL: 182 MS
Q-T INTERVAL: 352 MS
QRS DURATION: 80 MS
QTC CALCULATION (BEZET): 380 MS
R AXIS: -18 DEGREES
T AXIS: 28 DEGREES
VENTRICULAR RATE: 70 BPM

## 2023-10-17 PROCEDURE — 3008F BODY MASS INDEX DOCD: CPT | Performed by: INTERNAL MEDICINE

## 2023-10-17 PROCEDURE — 93005 ELECTROCARDIOGRAM TRACING: CPT

## 2023-10-17 PROCEDURE — 3074F SYST BP LT 130 MM HG: CPT | Performed by: INTERNAL MEDICINE

## 2023-10-17 PROCEDURE — 99214 OFFICE O/P EST MOD 30 MIN: CPT | Performed by: INTERNAL MEDICINE

## 2023-10-17 PROCEDURE — 93010 ELECTROCARDIOGRAM REPORT: CPT | Performed by: INTERNAL MEDICINE

## 2023-10-17 PROCEDURE — 3079F DIAST BP 80-89 MM HG: CPT | Performed by: INTERNAL MEDICINE

## 2023-10-20 ENCOUNTER — HOSPITAL ENCOUNTER (OUTPATIENT)
Dept: CV DIAGNOSTICS | Facility: HOSPITAL | Age: 63
Discharge: HOME | End: 2023-10-20
Attending: INTERNAL MEDICINE

## 2023-10-20 DIAGNOSIS — R07.89 ATYPICAL CHEST PAIN: ICD-10-CM

## 2023-10-20 LAB
% OF MAX PREDICTED HR: 100 %
MAX DIASTOLIC BP: 76 MMHG
MAX HEART RATE: 162 BPM
MAX PREDICTED HEART RATE: 155 BPM
MAX SYSTOLIC BP: 171 MMHG
MAX WORK LOAD: 82

## 2023-10-20 PROCEDURE — 93017 CV STRESS TEST TRACING ONLY: CPT | Performed by: INTERNAL MEDICINE

## 2023-10-20 PROCEDURE — 93018 CV STRESS TEST I&R ONLY: CPT | Performed by: INTERNAL MEDICINE

## 2023-10-20 PROCEDURE — 93016 CV STRESS TEST SUPVJ ONLY: CPT | Performed by: INTERNAL MEDICINE

## 2023-12-08 NOTE — TELEPHONE ENCOUNTER
Please review; protocol failed. Requested Prescriptions   Pending Prescriptions Disp Refills    BENAZEPRIL-HYDROCHLOROTHIAZIDE 20-12.5 MG Oral Tab [Pharmacy Med Name: BENAZEPRIL-HCTZ 20-12.5 MG TAB] 90 tablet 2     Sig: TAKE 1 TABLET BY MOUTH EVERY DAY       Hypertensive Medications Protocol Failed - 12/7/2023  1:14 AM        Failed - CMP or BMP in past 6 months     No results found for this or any previous visit (from the past 4392 hour(s)).             Passed - In person appointment in the past 12 or next 3 months     Recent Outpatient Visits              1 month ago Essential hypertension    Cecilia Stokes MD    Office Visit    3 months ago Prostate [facility])    Charles Brooks MD    Office Visit    5 months ago Essential hypertension    Cecilia Stokes MD    Office Visit    7 months ago Rhinitis medicamentosa    Luis Radford MD    Office Visit    8 months ago Rhinitis medicamentosa    Luis Radford MD    Office Visit          Future Appointments         Provider Department Appt Notes    In 2 months Mercedes Yancey MD [facility], [address] [place] [address], [address] [place] [address] BP reading less than 140/90     BP Readings from Last 1 Encounters:   10/17/23 120/80               Passed - In person appointment or virtual visit in the past 6 months     Recent Outpatient Visits              1 month ago Essential hypertension    Cecilia Stokes MD    Office Visit    3 months ago [facility])    Wai Moore MD    Office Visit    5 months ago Essential hypertension    [facility], [facility] Shannan Pelletier MD    Office Visit    7 months ago Rhinitis medicamentosa    [address] [facility], Marit Gosselin, MD    Office Visit    8 months ago Rhinitis medicamentosa    [address] [place] [address], Marit Gosselin, MD    Office Visit          Future Appointments         Provider Department Appt Notes    In 2 months Jessica Mackay MD [facility], [address] [facility], [address] or GFRAA > 50     GFR Evaluation  EGFRCR: 63 , resulted on 3/16/2023               Future Appointments         Provider Department Appt Notes    In 2 months Jessica Mackay MD [address] [facility],[address], [address] [facility], [facility]             Recent Outpatient Visits              1 month ago Essential hypertension    John Durand MD    Office Visit    3 months ago Prostate cancer [facility])    [address] [facility], Allegra Anderson MD    Office Visit    5 months ago Essential hypertension    John Durand MD    Office Visit    7 months ago Rhinitis medicamentosa    [address] [place] [address], Marit Gosselin, MD    Office Visit    8 months ago Rhinitis medicamentosa    [address] [place] [address], Marit Gosselin, MD    Office Visit

## 2023-12-10 RX ORDER — BENAZEPRIL HYDROCHLORIDE AND HYDROCHLOROTHIAZIDE 20; 12.5 MG/1; MG/1
1 TABLET ORAL DAILY
Qty: 90 TABLET | Refills: 2 | Status: SHIPPED | OUTPATIENT
Start: 2023-12-10

## 2023-12-22 RX ORDER — ATORVASTATIN CALCIUM 10 MG/1
10 TABLET, FILM COATED ORAL EVERY OTHER DAY
Qty: 45 TABLET | Refills: 2 | Status: SHIPPED | OUTPATIENT
Start: 2023-12-22

## 2023-12-22 NOTE — TELEPHONE ENCOUNTER
Refill passed per [place] [employer] [facility] protocol.      Medication never prescribed by clinic    Requested Prescriptions   Pending Prescriptions Disp Refills    ATORVASTATIN 10 MG Oral Tab [Pharmacy Med Name: ATORVASTATIN 10 MG TABLET] 45 tablet 2     Sig: TAKE 1 TABLET BY MOUTH EVERY OTHER DAY       Cholesterol Medication Protocol Passed - 12/21/2023 12:41 AM        Passed - ALT in past 12 months        Passed - LDL in past 12 months        Passed - Last ALT < 80     Lab Results   Component Value Date    ALT 43 03/16/2023             Passed - Last LDL < 130     Lab Results   Component Value Date    LDL 76 03/16/2023             Passed - In person appointment or virtual visit in the past 12 mos or appointment in next 3 mos     Recent Outpatient Visits              2 months ago Essential hypertension    [address], [address] [facility], MD Cecilia    Office Visit    4 months ago Prostate cancer [facility])    Deborah Malloy MD    Office Visit    6 months ago Essential hypertension    Cecilia Chicas MD    Office Visit    7 months ago Rhinitis medicamentosa    [address], [address] [place] [address],[address], Stefanie Putnam MD    Office Visit    8 months ago Rhinitis medicamentosa    [facility], Stefanie Putnam MD    Office Visit          Future Appointments         Provider Department Appt Notes    In 2 months Sailaja Dawson MD [address], [place] [address]

## 2024-01-12 RX ORDER — TAMSULOSIN HYDROCHLORIDE 0.4 MG/1
0.4 CAPSULE ORAL 2 TIMES DAILY
Qty: 180 CAPSULE | Refills: 3 | Status: SHIPPED | OUTPATIENT
Start: 2024-01-12

## 2024-01-12 RX ORDER — BENAZEPRIL HYDROCHLORIDE AND HYDROCHLOROTHIAZIDE 20; 12.5 MG/1; MG/1
1 TABLET ORAL DAILY
Qty: 90 TABLET | Refills: 1 | Status: SHIPPED | OUTPATIENT
Start: 2024-01-12

## 2024-01-12 RX ORDER — AMLODIPINE BESYLATE 10 MG/1
10 TABLET ORAL DAILY
Qty: 90 TABLET | Refills: 3 | Status: SHIPPED | OUTPATIENT
Start: 2024-01-12

## 2024-01-12 RX ORDER — ATORVASTATIN CALCIUM 10 MG/1
10 TABLET, FILM COATED ORAL EVERY OTHER DAY
Qty: 45 TABLET | Refills: 1 | Status: SHIPPED | OUTPATIENT
Start: 2024-01-12

## 2024-01-12 NOTE — TELEPHONE ENCOUNTER
Flomax passes protocol but listed as external/historical.     Available refills for benazepril and atorvastatin were sent to mail [employer] today .    Please review; protocol failed/no protocol.     Requested Prescriptions   Pending Prescriptions Disp Refills    amLODIPine 10 MG Oral Tab 90 tablet 3     Sig: Take 1 tablet (10 mg total) by mouth daily.       Hypertensive Medications Protocol Failed - 1/12/2024  1:07 PM        Failed - CMP or BMP in past 6 months     No results found for this or any previous visit (from the past 4392 hour(s)).            Passed - In person appointment in the past 12 or next 3 months     Recent Outpatient Visits              2 months ago Essential hypertension    [facility]    Office Visit    5 months ago Prostate cancer (HCC)    [facility] Gabo Ovall[facility]    Office Visit    7 months ago Essential hypertension    [facility], Guilherme Arroyo MD    Office Visit    8 months ago [facility]osa    [facility]    [facility]    [facility]    Office Visit          Future Appointments         Provider Department Appt Notes    In 1 month Guilherme Borrero MD [facility]                Passed - Last BP reading less than 140/90     BP Readings from Last 1 Encounters:   10/17/23 120/80               Passed - In person appointment or virtual visit in the past 6 months     Recent Outpatient Visits              2 months ago Essential hypertension    [facility] [place]    Office Visit    5 months ago Prostate cancer (HCC)    [facility] - Rad/Onc [place], Gabo Ovalles MD     Office Visit    7 months ago Essential hypertension    [facility] Guilherme Borrero MD    Office Visit    [facility]    Office Visit    9 months [facility]    Office Visit          Future Appointments         Provider Department Appt Notes    In 1 month Guilherme Borrero MD [facility]                Passed - EGFRCR or GFRAA > 50     GFR Evaluation  EGFRCR: 63 , resulted on 3/16/2023            tamsulosin 0.4 MG Oral Cap 180 capsule 3     Sig: Take 1 capsule (0.4 mg total) by mouth in the morning and 1 capsule (0.4 mg total) before bedtime.       Genitourinary Medications Passed - 1/12/2024  1:07 PM        Passed - Patient does not have pulmonary hypertension on problem list        Passed - In person appointment or virtual visit in the past 12 mos or appointment in next 3 mos     Recent Outpatient Visits              2 months ago Essential hypertension    [facility] Guilherme Borrero MD    Office Visit    5 months ago Prostate cancer (HCC)    [facility] - Rad/Onc [facility], Gabo Ovalles MD    Office Visit    7 months ago Essential hypertension    [facility] Guilherme Borrero MD    Office Visit    8 months ago [facility]    Office Visit    9 months ago [facility]    [facility] Visit          Future Appointments         Provider Department Appt Notes    In 1 month Guilherme Borrero MD [facility]                 Signed  Prescriptions Disp Refills    atorvastatin 10 MG Oral Tab 45 tablet 1     Sig: Take 1 tablet (10 mg total) by mouth every other day.       Cholesterol Medication Protocol Passed - 1/12/2024  1:07 PM        Passed - ALT in past 12 months        Passed - LDL in past 12 months        Passed - Last ALT < 80     Lab Results   Component Value Date    ALT 43 03/16/2023             Passed - Last LDL < 130     Lab Results   Component Value Date    LDL 76 03/16/2023             Passed - In person appointment or virtual visit in the past 12 mos or appointment in next 3 mos     Recent Outpatient Visits              2 months ago [facility]    [facility]    Office Visit    5 months ago Prostate cancer (HCC)    [facility] - Rad/Onc [facility], Gabo Ovalles MD    Office Visit    7 months ago [facility]ential hypertension    [facility]    Office Visit    8 months ago Rhinitis medicamentosa    [facility] Gillian Juan MD    Office Visit    9 months ago Rhinitis medicamentosa    [facility] Gillian Juan MD    Office Visit          Future Appointments         Provider Department Appt Notes    In 1 month Guilherme Borrero MD [facility]                  Benazepril-hydroCHLOROthiazide 20-12.5 MG Oral Tab 90 tablet 1     Sig: Take 1 tablet by mouth daily.       Hypertensive Medications Protocol Failed - 1/12/2024  1:07 PM        Failed - CMP or BMP in past 6 months     No results found for this or any previous visit (from the past 4392 hour(s)).            Passed - In person appointment in the past 12 or next 3 months     Recent Outpatient Visits              2 months ago [facility]ential hypertension    [facility] Guilherme    Office Visit    5 months ago Prostate cancer (HCC)    [facility] - Rad/Onc [facility], Gabo Ovalles MD    Office Visit    7 months ago Essential hypertension    [facility] [place]    Office Visit    8 months ago Rhinitis medicamentosa    [facility] [place]    Office Visit    9 months ago Rhinitis medicamentosa    [facility] [place]    Office Visit          Future Appointments         Provider Department Appt Notes    In 1 month Guilherme Borrero MD [facility]                Passed - Last BP reading less than 140/90     BP Readings from Last 1 Encounters:   10/17/23 120/80               Passed - In person appointment or virtual visit in the past 6 months     Recent Outpatient Visits              2 months ago [facility]    [facility]    Office Visit    5 months ago Prostate cancer (HCC)    [facility] - Rad/Onc [facility], Gabo Ovalles MD    Office Visit    7 months ago Essential hypertension    [facility] [place]    Office Visit    8 months ago Rhinitis medicamentosa    [facility] [place]    Office Visit    9 months [facility] [place] [facility]    [facility] Visit          Future Appointments         Provider Department Appt Notes    In 1 month Guilherme Borrero MD [facility]                Passed - EGFRCR or GFRAA > 50     GFR Evaluation  EGFRCR: 63 , resulted on 3/16/2023                Recent Outpatient Visits              2 months ago Essential hypertension    [place]  [facility] Guilherme Borrero MD    Office Visit    5 months ago Prostate cancer (HCC)    [facility] - Rad/Onc [facility], Gabo Ovalles MD    Office Visit    7 months ago Essential hypertension    [facility]    Office Visit    8 months ago [facility]initis medicamentosa    [facility]    Office Visit    9 months ago Rhinitis medicamentosa    [facility]    Office Visit             Future Appointments         Provider Department Appt Notes    In 1 month Guilherme Borrero MD [facility]

## 2024-01-12 NOTE — TELEPHONE ENCOUNTER
Patient has read [employer] on 1/12/24.  We will wait for the reply.       preferred pharmacy  Message [phone]  From  Melissa Patel RN To  Chay Andrade Sent and Delivered  1/12/2024  1:13 PM   Last Read in [employer]  1/12/2024  1:49 PM by Chay Andrade

## 2024-01-12 NOTE — TELEPHONE ENCOUNTER
Refill passed per [facility] protocol.     Requested Prescriptions   Pending Prescriptions Disp Refills    atorvastatin 10 MG Oral Tab 45 tablet 3     Sig: Take 1 tablet (10 mg total) by mouth every other day.       Cholesterol Medication Protocol Passed - 1/12/2024  1:07 PM        Passed - ALT in past 12 months        Passed - LDL in past 12 months        Passed - Last ALT < 80     Lab Results   Component Value Date    ALT 43 03/16/2023             Passed - Last LDL < 130     Lab Results   Component Value Date    LDL 76 03/16/2023             Passed - In person appointment or virtual visit in the past 12 mos or appointment in next 3 mos     Recent Outpatient Visits              2 months ago Essential hypertension    [facility] Guilherme Borrero MD    Office Visit    5 months ago Prostate cancer (HCC)    [facility] - [facility]On[facility], Gabo Ovalles MD    Office Visit    7 months ago Essential hypertension    [facility]    Office Visit    8 months ago Rhinitis medicamentosa    [facility] Gillian Juan MD    Office Visit    9 [facility]    [facility]          Future Appointments         Provider Department Appt Notes    In 1 month Guilherme Borrero MD [facility]                  amLODIPine 10 MG Oral Tab 90 tablet 3     Sig: Take 1 tablet (10 mg total) by mouth daily.       Hypertensive Medications Protocol Failed - 1/12/2024  1:07 PM        Failed - CMP or BMP in past 6 months     No results found for this or any previous visit (from the past 4392 hour(s)).            Passed - In person appointment in the past 12 or next 3 months     Recent Outpatient Visits              2 months ago Essential  hypertension    [facility] [place]    Office Visit    5 months ago Prostate cancer (HCC)    [facility] - Rad/Onc [place], Gabo Ovalles MD    Office Visit    7 months ago Essential hypertension    [facility] [place]    Office Visit    8 months ago Rhinitis medicamentosa    [facility], Gillian Juan MD    Office Visit    9 months ago Rhinitis medicamentosa    [facility], Gillian Juan MD    Office Visit          Future Appointments         Provider Department Appt Notes    In 1 month Guilherme Borrero MD [facility]                Passed - Last BP reading less than 140/90     BP Readings from Last 1 Encounters:   10/17/23 120/80               Passed - In person appointment or virtual visit in the past 6 months     Recent Outpatient Visits              2 months ago Essential hypertension    [facility] [place]    Office Visit    5 months ago Prostate [facility] (HCC)    [facility] Gabo Ovalles MD    Office Visit    7 months ago Essential hypertension    [facility] [place]    Office Visit    8 months ago Rhinitis medicamentosa    [facility] [place]    Office Visit    9 months ago Rhinitis medicamentosa    [facility], Gillian Juan MD    Office Visit          Future Appointments         Provider Department Appt Notes    In 1 month Guilherme Borrero MD [facility]                Passed - EGFRCR or GFRAA > 50     GFR Evaluation  EGFRCR: 63 , resulted on 3/16/2023             Benazepril-hydroCHLOROthiazide 20-12.5 MG Oral Tab 90 tablet 3     Sig: Take 1 tablet by mouth daily.       Hypertensive Medications Protocol Failed - 1/12/2024  1:07 PM        Failed - CMP or BMP in past 6 months     No results found for this or any previous visit (from the past 4392 hour(s)).            Passed - In person appointment in the past 12 or next 3 months     Recent Outpatient Visits              2 months ago Essential hypertension    [facility], Guilherme Arroyo MD    Office Visit    5 months ago Prostate cancer (HCC)    [facility] - Rad/Onc [place], Gabo Ovalles MD    Office Visit    7 months ago Essential hypertension    [facility], Guilherme Arroyo MD    Office Visit    8 months [facility]    Office Visit    9 months ago Rhinitis [facility], Gillian Juan MD    Office Visit          Future Appointments         Provider Department Appt Notes    In 1 month Guilherme Borrero MD [facility]                Passed - Last BP reading less than 140/90     BP Readings from Last 1 Encounters:   10/17/23 120/80               Passed - In person appointment or virtual visit in the past 6 months     Recent Outpatient Visits              2 months ago Essential hypertension    [facility], Guilherme Arroyo MD    Office Visit    5 months ago Prostate cancer (HCC)    [facility] - Rad/Onc [facility], Gabo Ovalles MD    Office Visit    7 months ago [facility]ential [facility]    Office Visit    8 months ago [facility]  Gillian VALENCIA MD    Office Visit    9 months ago Rhinitis medicamentosa    [facility] Gillian Randall MD    Office Visit          Future Appointments         Provider Department Appt Notes    In 1 month Guilherme Borrero MD [facility]                Passed - EGFRCR or GFRAA > 50     GFR Evaluation  EGFRCR: 63 , resulted on 3/16/2023            tamsulosin 0.4 MG Oral Cap 180 capsule 3     Sig: Take 1 capsule (0.4 mg total) by mouth in the morning and 1 capsule (0.4 mg total) before bedtime.       Genitourinary Medications Passed - 1/12/2024  1:07 PM        Passed - Patient does not have pulmonary hypertension on problem list        Passed - In person appointment or virtual visit in the past 12 mos or appointment in next 3 mos     Recent Outpatient Visits              2 months ago Essential hypertension    [facility] Guilherme Borrero MD    Office Visit    5 months ago Prostate cancer (HCC)    [facility] - Rad/Onc [facility], Gabo Ovalles MD    Office Visit    7 months ago Essential hypertension    [facility] Guilherme Borrero MD    Office Visit    8 months [facility]    Office [facility]    9 months [facility]    Office Visit          Future Appointments         Provider Department Appt Notes    In 1 month Guilherme Borrero MD [facility]                      Future Appointments         Provider Department Appt Notes    In 1 month Guilherme Borrero MD [facility]              Recent Outpatient Visits              2 months ago Essential hypertension    [facility], [facility], Guilherme Arroyo MD    Office Visit    5 months ago Prostate cancer (HCC)    [facility] - Rad/Onc [facility], Gaob Ovalles MD    Office Visit    7 months ago Essential hypertension    [facility], [facility], Guilherme Arroyo MD    Office Visit    8 months ago Rhinitis medicamentosa    [facility], Gillian Juan MD    Office Visit    9 [facility]    Office Visit

## 2024-01-12 NOTE — TELEPHONE ENCOUNTER
atorvastatin 10 MG Oral Tab, Take 1 tablet (10 mg total) by mouth every other day., Disp: 45 tablet, Rfl: 2      Benazepril-hydroCHLOROthiazide 20-12.5 MG Oral Tab, Take 1 tablet by mouth daily., Disp: 90 tablet, Rfl: 2      tamsulosin 0.4 MG Oral Cap, Take 1 capsule (0.4 mg total) by mouth in the morning and 1 capsule (0.4 mg total) before bedtime., Disp: , Rfl:       amLODIPine 10 MG Oral Tab, Take 1 tablet (10 mg total) by mouth daily., Disp: 90 tablet, Rfl: 3

## 2024-01-23 ENCOUNTER — TELEPHONE (OUTPATIENT)
Dept: OTOLARYNGOLOGY | Facility: CLINIC | Age: 64
End: 2024-01-23

## 2024-01-23 NOTE — TELEPHONE ENCOUNTER
Current Outpatient Medications:     montelukast 10 MG Oral Tab, TAKE 1 TABLET BY MOUTH EVERY DAY AT NIGHT, Disp: 90 tablet, Rfl: 1    azelastine 0.1 % Nasal Solution, 2 sprays by Nasal route 2 (two) times daily., Disp: 30 mL, Rfl: 5    fluticasone propionate 50 MCG/ACT Nasal Suspension, 1 spray by Nasal route 2 (two) times daily., Disp: 16 g, Rfl: 3      PLEASE SEND REFILLS TO [facility]

## 2024-01-24 RX ORDER — MONTELUKAST SODIUM 10 MG/1
10 TABLET ORAL NIGHTLY
Qty: 90 TABLET | Refills: 2 | Status: SHIPPED | OUTPATIENT
Start: 2024-01-24

## 2024-01-24 RX ORDER — AZELASTINE 1 MG/ML
2 SPRAY, METERED NASAL 2 TIMES DAILY
Qty: 30 ML | Refills: 2 | Status: SHIPPED | OUTPATIENT
Start: 2024-01-24

## 2024-01-24 RX ORDER — FLUTICASONE PROPIONATE 50 MCG
1 SPRAY, SUSPENSION (ML) NASAL 2 TIMES DAILY
Qty: 16 G | Refills: 2 | Status: SHIPPED | OUTPATIENT
Start: 2024-01-24

## 2024-02-09 ENCOUNTER — LAB ENCOUNTER (OUTPATIENT)
Dept: LAB | Facility: HOSPITAL | Age: 64
End: 2024-02-09
Attending: RADIOLOGY
Payer: MEDICARE

## 2024-02-09 DIAGNOSIS — C61 PROSTATE CANCER (HCC): ICD-10-CM

## 2024-02-09 LAB — PSA SERPL-MCNC: 0.28 NG/ML (ref ?–4)

## 2024-02-09 PROCEDURE — 36415 COLL VENOUS BLD VENIPUNCTURE: CPT

## 2024-02-09 PROCEDURE — 84153 ASSAY OF PSA TOTAL: CPT

## 2024-02-20 ENCOUNTER — OFFICE VISIT (OUTPATIENT)
Facility: CLINIC | Age: 64
End: 2024-02-20
Payer: MEDICARE

## 2024-02-20 VITALS
WEIGHT: 204 LBS | HEART RATE: 77 BPM | OXYGEN SATURATION: 98 % | DIASTOLIC BLOOD PRESSURE: 82 MMHG | SYSTOLIC BLOOD PRESSURE: 120 MMHG | HEIGHT: 71 IN | BODY MASS INDEX: 28.56 KG/M2

## 2024-02-20 DIAGNOSIS — Z85.46 HISTORY OF PROSTATE CANCER: Primary | ICD-10-CM

## 2024-02-20 DIAGNOSIS — R73.02 IMPAIRED GLUCOSE TOLERANCE: ICD-10-CM

## 2024-02-20 DIAGNOSIS — N40.0 BENIGN PROSTATIC HYPERPLASIA WITHOUT LOWER URINARY TRACT SYMPTOMS: ICD-10-CM

## 2024-02-20 DIAGNOSIS — H92.02 LEFT EAR PAIN: ICD-10-CM

## 2024-02-20 DIAGNOSIS — I10 ESSENTIAL HYPERTENSION: Chronic | ICD-10-CM

## 2024-02-20 DIAGNOSIS — M43.16 SPONDYLOLISTHESIS, LUMBAR REGION: ICD-10-CM

## 2024-02-20 DIAGNOSIS — E78.5 HYPERLIPIDEMIA, UNSPECIFIED HYPERLIPIDEMIA TYPE: Chronic | ICD-10-CM

## 2024-02-20 DIAGNOSIS — M47.12 CERVICAL SPONDYLOSIS WITH MYELOPATHY: ICD-10-CM

## 2024-02-20 PROBLEM — C61 PROSTATE CANCER (HCC): Status: RESOLVED | Noted: 2023-03-15 | Resolved: 2024-02-20

## 2024-02-20 PROCEDURE — 96160 PT-FOCUSED HLTH RISK ASSMT: CPT | Performed by: INTERNAL MEDICINE

## 2024-02-20 PROCEDURE — G0439 PPPS, SUBSEQ VISIT: HCPCS | Performed by: INTERNAL MEDICINE

## 2024-02-20 PROCEDURE — 99213 OFFICE O/P EST LOW 20 MIN: CPT | Performed by: INTERNAL MEDICINE

## 2024-02-20 NOTE — PROGRESS NOTES
Subjective:   Chay Andrade is a 63 year old male who presents for a MA (Medicare Advantage) Supervisit (Once per calendar year) and scheduled follow up of multiple significant but stable problems and acute uncomplicated problem.   63-year-old gentleman here for Medicare advantage super visit and also for evaluation of left earache.  He informed me that his ear pain started 2 days back.  No ear discharge.  No fever or chills.  No abuse no depression no falls reported.    History/Other:   Fall Risk Assessment:   He has been screened for Falls and is low risk.      Cognitive Assessment:   He had a completely normal cognitive assessment - see flowsheet entries     Functional Ability/Status:   Chay Andrade has some abnormal functions as listed below:  He has Hearing problems based on screening of functional status.He has Vision problems based on screening of functional status. He has Walking problems based on screening of functional status. He has problems with Daily Activities based on screening of functional status. He has problems with Memory based on screening of functional status.       Depression Screening (PHQ-2/PHQ-9): PHQ-2 SCORE: 0  , done 2/20/2024        <5 minutes spent screening and counseling for depression    Advanced Directives:   He does NOT have a Living Will. [Do you have a living will?: No]  He has a Power of  for Health Care on file in [employer].  Discussed Advance Care Planning with patient (and family/surrogate if present). Standard forms made available to patient in After Visit Summary.      Patient Active Problem List   Diagnosis    Spondylolisthesis, lumbar region    Essential hypertension    Hyperlipidemia    Cervical spondylosis with myelopathy    Impaired glucose tolerance    History of prostate cancer    Benign prostatic hyperplasia without lower urinary tract symptoms     Allergies:  He has No Known Allergies.    Current Medications:  Outpatient Medications Marked as Taking for  the 2/20/24 encounter (Office Visit) with Guilherme Borrero MD   Medication Sig    fluticasone propionate 50 MCG/ACT Nasal Suspension 1 spray by Nasal route 2 (two) times daily.    montelukast 10 MG Oral Tab Take 1 tablet (10 mg total) by mouth nightly.    azelastine 0.1 % Nasal Solution 2 sprays by Nasal route 2 (two) times daily.    atorvastatin 10 MG Oral Tab Take 1 tablet (10 mg total) by mouth every other day.    amLODIPine 10 MG Oral Tab Take 1 tablet (10 mg total) by mouth daily.    Benazepril-hydroCHLOROthiazide 20-12.5 MG Oral Tab Take 1 tablet by mouth daily.    tamsulosin 0.4 MG Oral Cap Take 1 capsule (0.4 mg total) by mouth in the morning and 1 capsule (0.4 mg total) before bedtime.    finasteride 5 MG Oral Tab Take 1 tablet (5 mg total) by mouth daily.    aspirin 81 MG Oral Tab EC Take 1 tablet (81 mg total) by mouth daily.    baclofen 10 MG Oral Tab Take 1 tablet (10 mg total) by mouth 3 (three) times daily.    diclofenac 75 MG Oral Tab EC Take 1 tablet (75 mg total) by mouth daily.    cholecalciferol 125 MCG (5000 UT) Oral Cap Take 1 capsule (5,000 Units total) by mouth daily.       Medical History:  He  has a past medical history of Back problem, Calculus of kidney, High blood pressure, High cholesterol, Osteoarthritis, PONV (postoperative nausea and vomiting), and Visual impairment.  Surgical History:  He  has a past surgical history that includes cyst removal; arthroscopy of joint unlisted (Left); spine surgery procedure unlisted; and colonoscopy.   Family History:  His family history includes Diabetes in his mother.  Social History:  He  reports that he has never smoked. He has never used smokeless tobacco. He reports that he does not drink alcohol and does not use drugs.    Tobacco:  He has never smoked tobacco.    CAGE Alcohol Screen:   CAGE screening score of 0 on 2/13/2024, showing low risk of alcohol abuse.      Patient Care Team:  Guilherme Borrero MD as PCP - General (Internal Medicine)  ,  Gabo Ovalles MD (Radiation Oncology)  Xavier Hooper MD (Radiation Oncology)    Review of Systems   Constitutional:  Negative for activity change, appetite change and fever.   HENT:  Positive for ear pain. Negative for congestion and voice change.    Respiratory:  Negative for cough and shortness of breath.    Cardiovascular:  Negative for chest pain.   Gastrointestinal:  Negative for abdominal distention, abdominal pain and vomiting.   Genitourinary:  Negative for hematuria.   Skin:  Negative for wound.   Psychiatric/Behavioral:  Negative for behavioral problems.          Objective:   Physical Exam  Constitutional:       Appearance: Normal appearance.   HENT:      Head: Normocephalic.   Eyes:      Conjunctiva/sclera: Conjunctivae normal.   Cardiovascular:      Rate and Rhythm: Normal rate and regular rhythm.      Heart sounds: Normal heart sounds. No murmur heard.  Pulmonary:      Effort: Pulmonary effort is normal.      Breath sounds: Normal breath sounds. No rhonchi or rales.   Abdominal:      General: Bowel sounds are normal.      Palpations: Abdomen is soft.      Tenderness: There is no abdominal tenderness.   Musculoskeletal:      Cervical back: Neck supple.      Right lower leg: No edema.      Left lower leg: No edema.   Skin:     General: Skin is warm and dry.   Neurological:      General: No focal deficit present.      Mental Status: He is alert and oriented to person, place, and time. Mental status is at baseline.   Psychiatric:         Mood and Affect: Mood normal.         Behavior: Behavior normal.     Left ear-has erythema in the ear canal.  Tympanic membrane-not able to visualize because of cerumen/debris.  No tragus tenderness  Right ear-tympanic membrane clearly visible.    /82 (BP Location: Right arm, Patient Position: Sitting, Cuff Size: adult)   Pulse 77   Ht 5' 11\" (1.803 m)   Wt 204 lb (92.5 kg)   SpO2 98%   BMI 28.45 kg/m²  Estimated body mass index is 28.45 kg/m² as calculated  from the following:    Height as of this encounter: 5' 11\" (1.803 m).    Weight as of this encounter: 204 lb (92.5 kg).    Medicare Hearing Assessment:   Hearing Screening    Time taken: 2/20/2024  9:37 AM  Entry User: Wendy Mares CMA  Screening Method: Finger Rub  Finger Rub Result: Pass                     Assessment & Plan:   Chay Andrade is a 63 year old male who presents for a Medicare Assessment.     1. History of prostate cancer (Primary)  Overview:  Dr mj SRINIVASAN  2. Benign prostatic hyperplasia without lower urinary tract symptoms stable  3. Essential hypertension controlled  -     CBC, Platelet; No Differential  -     Comp Metabolic Panel (14)  -     Hemoglobin A1C  -     Lipid Panel  -     TSH W Reflex To Free T4  4. Hyperlipidemia, unspecified hyperlipidemia type monitor lipid profile LFT   5. Impaired glucose tolerance check A1c.  Discussed that  -     Hemoglobin A1C  6. Cervical spondylosis with myelopathy  Overview:  Dr Mcmanus and Dr Gaming at [place]  7. Spondylolisthesis, lumbar region  Overview:  s/p extreme lateral interbody fusion, L4-5 with L4-5 discectomy and fusion Dr Mcmanus    Additional evaluation apart from Medicare advantage super visit  8. Left ear pain -I think the erythema is from trauma from the usage of Q-tips.  Encourage patient to avoid inserting materials to the ear canal.  I do not think there is any acute ear infection.  I have given him referral to see ENT.  If he develops any discharge, or fever he will contact me.  -     ENT Referral - [place] ([facility])    The patient indicates understanding of these issues and agrees to the plan.  Reinforced healthy diet, lifestyle, and exercise.      No follow-ups on file.     Guilherme Borrero MD, 2/20/2024     Supplementary Documentation:   General Health:  In the past six months, have you lost more than 10 pounds without trying?: 3 - Don't know  Has your appetite been poor?: No  Type of Diet: Low Salt;Low Carb  How does the  patient maintain a good energy level?: Daily Walks  How would you describe your current health state?: Fair  On a scale of 0 to 10, with 0 being no pain and 10 being severe pain, what is your pain level?: 5 - (Moderate)  In the past six months, have you experienced urine leakage?: 1-Yes  At any time do you feel concerned for the safety/well-being of yourself and/or your children, in your home or elsewhere?: No  Have you had any immunizations at another office such as Influenza, Hepatitis B, Tetanus, or Pneumococcal?: No        Chay Andrade's SCREENING SCHEDULE   Tests on this list are recommended by your physician but may not be covered, or covered at this frequency, by your insurer.   Please check with your insurance carrier before scheduling to verify coverage.   PREVENTATIVE SERVICES FREQUENCY &  COVERAGE DETAILS LAST COMPLETION DATE   Diabetes Screening    Fasting Blood Sugar / Glucose    One screening every 12 months if never tested or if previously tested but not diagnosed with pre-diabetes   One screening every 6 months if diagnosed with pre-diabetes Lab Results   Component Value Date     (H) 03/16/2023        Cardiovascular Disease Screening    Lipid Panel  Cholesterol  Lipoprotein (HDL)  Triglycerides Covered every 5 years for all Medicare beneficiaries without apparent signs or symptoms of cardiovascular disease Lab Results   Component Value Date    CHOLEST 151 03/16/2023    HDL 63 (H) 03/16/2023    LDL 76 03/16/2023    TRIG 59 03/16/2023         Electrocardiogram (EKG)   Covered if needed at Welcome to Medicare, and non-screening if indicated for medical reasons 10/17/2023      Ultrasound Screening for Abdominal Aortic Aneurysm (AAA) Covered once in a lifetime for one of the following risk factors    Men who are 65-75 years old and have ever smoked    Anyone with a family history -     Colorectal Cancer Screening  Covered for ages 50-85; only need ONE of the following:    Colonoscopy   Covered  every 10 years    Covered every 2 years if patient is at high risk or previous colonoscopy was abnormal 10/08/2021    Health Maintenance   Topic Date Due    Colorectal Cancer Screening  10/08/2031       Flexible Sigmoidoscopy   Covered every 4 years -    Fecal Occult Blood Test Covered annually -   Prostate Cancer Screening    Prostate-Specific Antigen (PSA) Annually Lab Results   Component Value Date    PSA 0.83 08/02/2023     Health Maintenance   Topic Date Due    PSA  02/09/2026      Immunizations    Influenza Covered once per flu season  Please get every year 09/14/2023  No recommendations at this time    Pneumococcal Each vaccine ([id number] & [id number]) covered once after 65 Prevnar 13: -    [id number]: -     No recommendations at this time    Hepatitis B One screening covered for patients with certain risk factors   -  No recommendations at this time    Tetanus Toxoid Not covered by Medicare Part B unless medically necessary (cut with metal); may be covered with your pharmacy prescription benefits -    Tetanus, Diptheria and Pertusis TD and TDaP Not covered by Medicare Part B -  No recommendations at this time    Zoster Not covered by Medicare Part B; may be covered with your pharmacy  prescription benefits -  No recommendations at this time     Annual Monitoring of Persistent Medications (ACE/ARB, digoxin diuretics, anticonvulsants)    Potassium Annually Lab Results   Component Value Date    K 3.5 03/16/2023         Creatinine   Annually Lab Results   Component Value Date    CREATSERUM 1.26 03/16/2023         BUN Annually Lab Results   Component Value Date    BUN 16 03/16/2023       Drug Serum Conc Annually No results found for: \"DIGOXIN\", \"DIG\", \"VALP\"

## 2024-02-22 ENCOUNTER — OFFICE VISIT (OUTPATIENT)
Dept: AUDIOLOGY | Facility: CLINIC | Age: 64
End: 2024-02-22

## 2024-02-22 ENCOUNTER — OFFICE VISIT (OUTPATIENT)
Dept: OTOLARYNGOLOGY | Facility: CLINIC | Age: 64
End: 2024-02-22
Payer: MEDICARE

## 2024-02-22 VITALS — BODY MASS INDEX: 28.56 KG/M2 | HEIGHT: 71 IN | WEIGHT: 204 LBS

## 2024-02-22 DIAGNOSIS — H90.3 ASYMMETRICAL SENSORINEURAL HEARING LOSS: Primary | ICD-10-CM

## 2024-02-22 DIAGNOSIS — H91.92 HEARING LOSS OF LEFT EAR, UNSPECIFIED HEARING LOSS TYPE: Primary | ICD-10-CM

## 2024-02-22 DIAGNOSIS — H61.22 CERUMEN DEBRIS ON TYMPANIC MEMBRANE OF LEFT EAR: ICD-10-CM

## 2024-02-22 DIAGNOSIS — J34.3 NASAL TURBINATE HYPERTROPHY: ICD-10-CM

## 2024-02-22 PROCEDURE — 92567 TYMPANOMETRY: CPT | Performed by: AUDIOLOGIST

## 2024-02-22 PROCEDURE — 69210 REMOVE IMPACTED EAR WAX UNI: CPT | Performed by: SPECIALIST

## 2024-02-22 PROCEDURE — 99213 OFFICE O/P EST LOW 20 MIN: CPT | Performed by: SPECIALIST

## 2024-02-22 PROCEDURE — 92557 COMPREHENSIVE HEARING TEST: CPT | Performed by: AUDIOLOGIST

## 2024-02-22 NOTE — PROGRESS NOTES
Chay Andrade is a 63 year old male.   Chief Complaint   Patient presents with    Ear Problem     Left ear      HPI:   Patient with decreased hearing suddenly in the left ear.    Current Outpatient Medications   Medication Sig Dispense Refill    fluticasone propionate 50 MCG/ACT Nasal Suspension 1 spray by Nasal route 2 (two) times daily. 16 g 2    montelukast 10 MG Oral Tab Take 1 tablet (10 mg total) by mouth nightly. 90 tablet 2    azelastine 0.1 % Nasal Solution 2 sprays by Nasal route 2 (two) times daily. 30 mL 2    atorvastatin 10 MG Oral Tab Take 1 tablet (10 mg total) by mouth every other day. 45 tablet 1    amLODIPine 10 MG Oral Tab Take 1 tablet (10 mg total) by mouth daily. 90 tablet 3    Benazepril-hydroCHLOROthiazide 20-12.5 MG Oral Tab Take 1 tablet by mouth daily. 90 tablet 1    tamsulosin 0.4 MG Oral Cap Take 1 capsule (0.4 mg total) by mouth in the morning and 1 capsule (0.4 mg total) before bedtime. 180 capsule 3    finasteride 5 MG Oral Tab Take 1 tablet (5 mg total) by mouth daily.      aspirin 81 MG Oral Tab EC Take 1 tablet (81 mg total) by mouth daily.      baclofen 10 MG Oral Tab Take 1 tablet (10 mg total) by mouth 3 (three) times daily.      diclofenac 75 MG Oral Tab EC Take 1 tablet (75 mg total) by mouth daily.      cholecalciferol 125 MCG (5000 UT) Oral Cap Take 1 capsule (5,000 Units total) by mouth daily.        Past Medical History:   Diagnosis Date    Back problem     Calculus of kidney     20 yrs ago [facility]     High blood pressure     High cholesterol     Osteoarthritis     PONV (postoperative nausea and vomiting)     Visual impairment     glasses      Social History:  Social History     Socioeconomic History    Marital status:    Tobacco Use    Smoking status: Never    Smokeless tobacco: Never   Vaping Use    Vaping Use: Never used   Substance and Sexual Activity    Alcohol use: Never    Drug use: Never    Sexual activity: Yes        REVIEW OF SYSTEMS:   GENERAL HEALTH:  feels well otherwise  GENERAL : denies fever, chills, sweats, weight loss, weight gain  SKIN: denies any unusual skin lesions or rashes  RESPIRATORY: denies shortness of breath with exertion  NEURO: denies headaches    EXAM:   Ht 5' 11\" (1.803 m)   Wt 204 lb (92.5 kg)   BMI 28.45 kg/m²   System Details   Skin Inspection - Normal.   Constitutional Overall appearance - Normal.   Head/Face Facial features - Normal. Eyebrows - Normal. Skull - Normal.   Eyes Conjunctiva - Right: Normal, Left: Normal. Pupil - Right: Normal, Left: Normal.    Ears Inspection - Right: Normal, Left: Normal.   Ears = left cerumen occlussion    Fully cleaned under microscope using instrumentation and suctioning.    Normal tympanic membranes.  Tuning fork 512 Hz right equals left.   Nasal External nose - Normal.   Nasal septum - Normal.  Turbinates -congested   Oral/Oropharynx Lips - Normal, Tonsils - Normal, Tongue - Normal    Neck Exam Inspection - Normal. Palpation - Normal. Parotid gland - Normal. Thyroid gland - Normal.  No carotid bruits by auscultation   Lymph Detail Submental. Submandibular. Anterior cervical. Posterior cervical. Supraclavicular all without enlargement   Psychiatric Orientation - Oriented to time, place, person & situation. Appropriate mood and affect.   Neurological Memory - Normal. Cranial nerves - Cranial nerves II through XII grossly intact.     ASSESSMENT AND PLAN:   1. Hearing loss of left ear, unspecified hearing loss type  Patient with audiogram which actually shows an asymmetric hearing loss beginning at 2000 Hz in all the way to 8000 Hz on the right greater than left ear.  Word discrimination score 100% bilaterally.  Patient's last audiogram was around 2013 and not available for review.  We discussed an MRI versus repeating the test in 1 year's time.  Patient would prefer the latter.  We also discussed hearing aids which she would like to wait on for now.  Patient was given a copy of the audiogram, a  clearance for hearing aids, and a pamphlet on hearing aid s should he change his mind.  - Audiology Referral - [facility])    2. Cerumen debris on tympanic membrane of left ear  Plan as above.  Follow-up in 1 year's time to repeat the audiogram, sooner if problems.    3.  Turbinate congestion  Continue Flonase and Astelin nasal sprays.      The patient indicates understanding of these issues and agrees to the plan.      Gillian Randall MD  2/22/2024  8:39 AM

## 2024-02-22 NOTE — PATIENT INSTRUCTIONS
You have a slight asymmetric hearing loss right worse than left.  Your word discrimination score is 100%.  As you did work in noise in the past this may be the culprit.  Repeat the audiogram in 1 year's time, sooner if problems.  You can consider hearing aids.  Your left ear was fully cleaned of cerumen.  Follow-up in 1 year's time, sooner if problems.

## 2024-02-26 ENCOUNTER — TELEPHONE (OUTPATIENT)
Dept: RADIATION ONCOLOGY | Facility: HOSPITAL | Age: 64
End: 2024-02-26

## 2024-03-18 DIAGNOSIS — C61 PROSTATE CANCER (HCC): Primary | ICD-10-CM

## 2024-03-21 ENCOUNTER — LAB ENCOUNTER (OUTPATIENT)
Dept: LAB | Facility: HOSPITAL | Age: 64
End: 2024-03-21
Attending: INTERNAL MEDICINE
Payer: MEDICARE

## 2024-03-21 LAB
ALBUMIN SERPL-MCNC: 4.1 G/DL (ref 3.2–4.8)
ALBUMIN/GLOB SERPL: 1.5 {RATIO} (ref 1–2)
ALP LIVER SERPL-CCNC: 53 U/L
ALT SERPL-CCNC: 14 U/L
ANION GAP SERPL CALC-SCNC: 2 MMOL/L (ref 0–18)
AST SERPL-CCNC: 9 U/L (ref ?–34)
BILIRUB SERPL-MCNC: 1.4 MG/DL (ref 0.2–1.1)
BUN BLD-MCNC: 18 MG/DL (ref 9–23)
BUN/CREAT SERPL: 15.3 (ref 10–20)
CALCIUM BLD-MCNC: 9.8 MG/DL (ref 8.7–10.4)
CHLORIDE SERPL-SCNC: 107 MMOL/L (ref 98–112)
CHOLEST SERPL-MCNC: 160 MG/DL (ref ?–200)
CO2 SERPL-SCNC: 32 MMOL/L (ref 21–32)
CREAT BLD-MCNC: 1.18 MG/DL
DEPRECATED RDW RBC AUTO: 40.9 FL (ref 35.1–46.3)
EGFRCR SERPLBLD CKD-EPI 2021: 68 ML/MIN/1.73M2 (ref 60–?)
ERYTHROCYTE [DISTWIDTH] IN BLOOD BY AUTOMATED COUNT: 12.3 % (ref 11–15)
EST. AVERAGE GLUCOSE BLD GHB EST-MCNC: 108 MG/DL (ref 68–126)
FASTING PATIENT LIPID ANSWER: YES
FASTING STATUS PATIENT QL REPORTED: YES
GLOBULIN PLAS-MCNC: 2.8 G/DL (ref 2.8–4.4)
GLUCOSE BLD-MCNC: 96 MG/DL (ref 70–99)
HBA1C MFR BLD: 5.4 % (ref ?–5.7)
HCT VFR BLD AUTO: 43.7 %
HDLC SERPL-MCNC: 59 MG/DL (ref 40–59)
HGB BLD-MCNC: 14.6 G/DL
LDLC SERPL CALC-MCNC: 88 MG/DL (ref ?–100)
MCH RBC QN AUTO: 30.4 PG (ref 26–34)
MCHC RBC AUTO-ENTMCNC: 33.4 G/DL (ref 31–37)
MCV RBC AUTO: 90.9 FL
NONHDLC SERPL-MCNC: 101 MG/DL (ref ?–130)
OSMOLALITY SERPL CALC.SUM OF ELEC: 294 MOSM/KG (ref 275–295)
PLATELET # BLD AUTO: 217 10(3)UL (ref 150–450)
POTASSIUM SERPL-SCNC: 3.6 MMOL/L (ref 3.5–5.1)
PROT SERPL-MCNC: 6.9 G/DL (ref 5.7–8.2)
RBC # BLD AUTO: 4.81 X10(6)UL
SODIUM SERPL-SCNC: 141 MMOL/L (ref 136–145)
TRIGL SERPL-MCNC: 66 MG/DL (ref 30–149)
TSI SER-ACNC: 1.16 MIU/ML (ref 0.55–4.78)
VLDLC SERPL CALC-MCNC: 11 MG/DL (ref 0–30)
WBC # BLD AUTO: 2.6 X10(3) UL (ref 4–11)

## 2024-03-21 PROCEDURE — 83036 HEMOGLOBIN GLYCOSYLATED A1C: CPT | Performed by: INTERNAL MEDICINE

## 2024-03-21 PROCEDURE — 84443 ASSAY THYROID STIM HORMONE: CPT | Performed by: INTERNAL MEDICINE

## 2024-03-21 PROCEDURE — 80053 COMPREHEN METABOLIC PANEL: CPT | Performed by: INTERNAL MEDICINE

## 2024-03-21 PROCEDURE — 80061 LIPID PANEL: CPT | Performed by: INTERNAL MEDICINE

## 2024-03-21 PROCEDURE — 85027 COMPLETE CBC AUTOMATED: CPT | Performed by: INTERNAL MEDICINE

## 2024-03-21 PROCEDURE — 36415 COLL VENOUS BLD VENIPUNCTURE: CPT | Performed by: INTERNAL MEDICINE

## 2024-03-23 DIAGNOSIS — D72.819 LEUKOPENIA, UNSPECIFIED TYPE: Primary | ICD-10-CM

## 2024-03-25 ENCOUNTER — TELEPHONE (OUTPATIENT)
Dept: HEMATOLOGY/ONCOLOGY | Facility: HOSPITAL | Age: 64
End: 2024-03-25

## 2024-03-25 ENCOUNTER — TELEPHONE (OUTPATIENT)
Dept: INTERNAL MEDICINE CLINIC | Facility: CLINIC | Age: 64
End: 2024-03-25

## 2024-03-25 NOTE — TELEPHONE ENCOUNTER
Patient called office. Date of birth and full name both confirmed.   Asking about information for referral, blood specialist.   Advised Hematology/Oncology referral placed.   Provided information and education on specialty   Provided referral information and MD's note \"Note: If he is busy, you can see 1 of his partners in the same group.\"  He verbalizes understanding of all information, and agreeable to plan to contact Hematologist.     CBC, Platelet; No Differential: Patient Communication     Append Comments   Seen    Dear Chay,     I have reviewed your blood work.  Blood counts did not show any evidence of anemia.  However, your WBC count is in the lower side.  I would like you to be seen by a blood specialist.  I have placed the referral.  Please call and make the appointment.  Kidney function is normal  Liver function is normal  Blood glucose is normal  Thyroid test is normal  Cholesterol numbers are in acceptable levels.    If you have any questions, please contact our office.  Thank you.     Sincerely,  Guilherme Borrero MD   Written by Guilherme Borrero MD on 3/23/2024  8:38 AM CDT  Seen by patient Chay Andrade on 3/25/2024  8:33 AM        Referred to Provider Information:  Provider Address Phone   Joyce Sevilla  [address] [facility] [address] [phone]

## 2024-03-25 NOTE — TELEPHONE ENCOUNTER
Please call the patient for a consult with Dr Sevilla for low white blood cell count.  Referred by Dr Borrero.

## 2024-03-27 ENCOUNTER — LAB ENCOUNTER (OUTPATIENT)
Dept: LAB | Facility: HOSPITAL | Age: 64
End: 2024-03-27
Attending: INTERNAL MEDICINE
Payer: MEDICARE

## 2024-03-27 ENCOUNTER — OFFICE VISIT (OUTPATIENT)
Dept: HEMATOLOGY/ONCOLOGY | Facility: HOSPITAL | Age: 64
End: 2024-03-27
Attending: INTERNAL MEDICINE
Payer: MEDICARE

## 2024-03-27 VITALS
WEIGHT: 205.69 LBS | OXYGEN SATURATION: 99 % | RESPIRATION RATE: 16 BRPM | SYSTOLIC BLOOD PRESSURE: 130 MMHG | TEMPERATURE: 98 F | BODY MASS INDEX: 29.45 KG/M2 | DIASTOLIC BLOOD PRESSURE: 80 MMHG | HEART RATE: 72 BPM | HEIGHT: 70 IN

## 2024-03-27 DIAGNOSIS — D72.819 LEUKOPENIA, UNSPECIFIED TYPE: ICD-10-CM

## 2024-03-27 DIAGNOSIS — Z85.46 HISTORY OF PROSTATE CANCER: ICD-10-CM

## 2024-03-27 DIAGNOSIS — D72.819 LEUKOPENIA, UNSPECIFIED TYPE: Primary | ICD-10-CM

## 2024-03-27 LAB
BASOPHILS # BLD AUTO: 0.03 X10(3) UL (ref 0–0.2)
BASOPHILS NFR BLD AUTO: 1 %
CD10 CELLS NFR SPEC: <1 %
CD10/CD19: <1 %
CD19 CELLS NFR SPEC: 7 %
CD19+/CD200+: 5 %
CD2 CELLS NFR SPEC: 83 %
CD20 CELLS NFR SPEC: 7 %
CD200 CELLS: 24 %
CD3 CELLS NFR SPEC: 61 %
CD3+/TCRGD+: 1 %
CD3+CD4+ CELLS NFR SPEC: 46 %
CD3+CD4+ CELLS/CD3+CD8+ CLL SPEC: 3.3
CD3+CD8+ CELLS NFR SPEC: 14 %
CD3-/CD56+: 29 %
CD34 CELLS NFR SPEC: <1 %
CD38 CELLS NFR SPEC: 4 %
CD38+/CD19+: <1 %
CD45 CELLS NFR SPEC: 100 %
CD5 CELLS NFR SPEC: 61 %
CD5/CD19 CELLS: 2 %
CD7 CELLS NFR SPEC: 88 %
CELL SURF KAPPA/LAMBDA RATIO: 1.3
CELL SURF LAMBDA LIGHT CHAIN: 3 %
CELL SURFACE KAPPA LIGHT CHAIN: 4 %
DEPRECATED RDW RBC AUTO: 41.1 FL (ref 35.1–46.3)
EOSINOPHIL # BLD AUTO: 0.11 X10(3) UL (ref 0–0.7)
EOSINOPHIL NFR BLD AUTO: 3.8 %
ERYTHROCYTE [DISTWIDTH] IN BLOOD BY AUTOMATED COUNT: 12.5 % (ref 11–15)
FOLATE SERPL-MCNC: >24 NG/ML (ref 5.4–?)
HAV AB SER QL IA: NONREACTIVE
HBV CORE AB SERPL QL IA: NONREACTIVE
HBV SURFACE AB SER QL: REACTIVE
HBV SURFACE AB SERPL IA-ACNC: 51.96 MIU/ML
HBV SURFACE AG SERPL QL IA: NONREACTIVE
HCT VFR BLD AUTO: 47 %
HCV AB SERPL QL IA: NONREACTIVE
HGB BLD-MCNC: 15.5 G/DL
IGA SERPL-MCNC: 245.6 MG/DL (ref 40–350)
IGM SERPL-MCNC: 27.2 MG/DL (ref 50–300)
IMM GRANULOCYTES # BLD AUTO: 0 X10(3) UL (ref 0–1)
IMM GRANULOCYTES NFR BLD: 0 %
IMMUNOGLOBULIN PNL SER-MCNC: 1229 MG/DL (ref 650–1600)
LYMPHOCYTES # BLD AUTO: 0.98 X10(3) UL (ref 1–4)
LYMPHOCYTES NFR BLD AUTO: 34 %
MCH RBC QN AUTO: 30 PG (ref 26–34)
MCHC RBC AUTO-ENTMCNC: 33 G/DL (ref 31–37)
MCV RBC AUTO: 90.9 FL
MONOCYTES # BLD AUTO: 0.41 X10(3) UL (ref 0.1–1)
MONOCYTES NFR BLD AUTO: 14.2 %
NEUTROPHILS # BLD AUTO: 1.35 X10 (3) UL (ref 1.5–7.7)
NEUTROPHILS # BLD AUTO: 1.35 X10(3) UL (ref 1.5–7.7)
NEUTROPHILS NFR BLD AUTO: 47 %
PLATELET # BLD AUTO: 228 10(3)UL (ref 150–450)
RBC # BLD AUTO: 5.17 X10(6)UL
TCR G-D CELLS NFR SPEC: 1 %
VIT B12 SERPL-MCNC: 1698 PG/ML (ref 211–911)
WBC # BLD AUTO: 2.9 X10(3) UL (ref 4–11)

## 2024-03-27 PROCEDURE — 86706 HEP B SURFACE ANTIBODY: CPT

## 2024-03-27 PROCEDURE — 88185 FLOWCYTOMETRY/TC ADD-ON: CPT

## 2024-03-27 PROCEDURE — 86708 HEPATITIS A ANTIBODY: CPT

## 2024-03-27 PROCEDURE — 84165 PROTEIN E-PHORESIS SERUM: CPT

## 2024-03-27 PROCEDURE — 86038 ANTINUCLEAR ANTIBODIES: CPT

## 2024-03-27 PROCEDURE — 88184 FLOWCYTOMETRY/ TC 1 MARKER: CPT

## 2024-03-27 PROCEDURE — 86704 HEP B CORE ANTIBODY TOTAL: CPT

## 2024-03-27 PROCEDURE — 99204 OFFICE O/P NEW MOD 45 MIN: CPT | Performed by: INTERNAL MEDICINE

## 2024-03-27 PROCEDURE — 85025 COMPLETE CBC W/AUTO DIFF WBC: CPT

## 2024-03-27 PROCEDURE — 86225 DNA ANTIBODY NATIVE: CPT

## 2024-03-27 PROCEDURE — 87389 HIV-1 AG W/HIV-1&-2 AB AG IA: CPT

## 2024-03-27 PROCEDURE — 87340 HEPATITIS B SURFACE AG IA: CPT

## 2024-03-27 PROCEDURE — 82784 ASSAY IGA/IGD/IGG/IGM EACH: CPT

## 2024-03-27 PROCEDURE — 83521 IG LIGHT CHAINS FREE EACH: CPT

## 2024-03-27 PROCEDURE — 36415 COLL VENOUS BLD VENIPUNCTURE: CPT

## 2024-03-27 PROCEDURE — 86803 HEPATITIS C AB TEST: CPT

## 2024-03-27 PROCEDURE — 88189 FLOWCYTOMETRY/READ 16 & >: CPT

## 2024-03-27 PROCEDURE — 86334 IMMUNOFIX E-PHORESIS SERUM: CPT

## 2024-03-27 PROCEDURE — 80503 PATH CLIN CONSLTJ SF 5-20: CPT

## 2024-03-27 PROCEDURE — 82607 VITAMIN B-12: CPT

## 2024-03-27 PROCEDURE — 82746 ASSAY OF FOLIC ACID SERUM: CPT

## 2024-03-27 NOTE — CONSULTS
Hematology Oncology Consultation Note    Patient Name: Chay Andrade   YOB: 1960   Medical Record Number: [id number]   CSN: [id number]   Consulting Physician: Joyce Sevilla MD  Referring Physician(s): Guilherme Borrero  Date of Consultation: 3/27/2024     Reason for Consultation:    Encounter Diagnoses   Name Primary?    Leukopenia, unspecified type Yes    History of prostate cancer      History of Present Illness:   Chay Andradeis a 64 year old Black or  male with history of prostate cancer s/p definitive RT 2022, HTN, HLD, DJD, spinal stenosis referred for evaluation of leukopenia.   Routine labs on 3/21/24 showed WBC 2.6, no differential. Hgb 14.6, Plt 217. Prior labs WBC 3.2 (3/2023), 4.2 (10/2020), 4.7 (2/2017), 3.2 (5/2007)   He feels well and denies any complaints. No fever, night sweats, unintentional weight loss or enlarged nodes. He is trying to eat more healthy to better control his diabetes. Takes vitamin D and MVT daily. No new medications. No recent illness or infection. No mouth sores or gingivitis. No history of chronic or frequent infections. No alcohol use or steroids. No history of liver or autoimmune disease. TSH was normal.      Past Medical History:  Past Medical History:   Diagnosis Date    Back problem     Calculus of kidney     20 yrs ago [facility]     High blood pressure     High cholesterol     Osteoarthritis     PONV (postoperative nausea and vomiting)     Visual impairment     glasses       Past Surgical History:  Past Surgical History:   Procedure Laterality Date    ARTHROSCOPY OF JOINT UNLISTED Left     rotator cuff repair and tendon repair     COLONOSCOPY      CYST REMOVAL      cyst removal on lower spine     SPINE SURGERY PROCEDURE UNLISTED      CERVICAL FUSION       Family Medical History:  Family History   Problem Relation Age of Onset    Diabetes Mother        Psychosocial History:  Social History     Socioeconomic History    Marital status:       Spouse name: Not on file    Number of children: Not on file    Years of education: Not on file    Highest education level: Not on file   Occupational History    Not on file   Tobacco Use    Smoking status: Never    Smokeless tobacco: Never   Vaping Use    Vaping Use: Never used   Substance and Sexual Activity    Alcohol use: Never    Drug use: Never    Sexual activity: Yes   Other Topics Concern    Not on file   Social History Narrative    Not on file     Social Determinants of Health     Financial Resource Strain: Not on file   Food Insecurity: Not on file   Transportation Needs: Not on file   Physical Activity: Not on file   Stress: Not on file   Social Connections: Not on file   Housing Stability: Not on file       Allergies:   No Known Allergies    Current Medications:    Current Outpatient Medications:     fluticasone propionate 50 MCG/ACT Nasal Suspension, 1 spray by Nasal route 2 (two) times daily., Disp: 16 g, Rfl: 2    montelukast 10 MG Oral Tab, Take 1 tablet (10 mg total) by mouth nightly., Disp: 90 tablet, Rfl: 2    azelastine 0.1 % Nasal Solution, 2 sprays by Nasal route 2 (two) times daily., Disp: 30 mL, Rfl: 2    atorvastatin 10 MG Oral Tab, Take 1 tablet (10 mg total) by mouth every other day., Disp: 45 tablet, Rfl: 1    amLODIPine 10 MG Oral Tab, Take 1 tablet (10 mg total) by mouth daily., Disp: 90 tablet, Rfl: 3    Benazepril-hydroCHLOROthiazide 20-12.5 MG Oral Tab, Take 1 tablet by mouth daily., Disp: 90 tablet, Rfl: 1    tamsulosin 0.4 MG Oral Cap, Take 1 capsule (0.4 mg total) by mouth in the morning and 1 capsule (0.4 mg total) before bedtime., Disp: 180 capsule, Rfl: 3    finasteride 5 MG Oral Tab, Take 1 tablet (5 mg total) by mouth daily., Disp: , Rfl:     aspirin 81 MG Oral Tab EC, Take 1 tablet (81 mg total) by mouth daily., Disp: , Rfl:     baclofen 10 MG Oral Tab, Take 1 tablet (10 mg total) by mouth 3 (three) times daily., Disp: , Rfl:     diclofenac 75 MG Oral Tab EC, Take 1 tablet  (75 mg total) by mouth daily., Disp: , Rfl:     cholecalciferol 125 MCG (5000 UT) Oral Cap, Take 1 capsule (5,000 Units total) by mouth daily., Disp: , Rfl:     Review of Systems:  A comprehensive 10-point ROS completed all negative unless otherwise documented in HPI.     Vital Signs:  /80 (BP Location: Left arm, Patient Position: Sitting, Cuff Size: adult)   Pulse 72   Temp 98 °F (36.7 °C) (Oral)   Resp 16   Ht 1.778 m (5' 10\")   Wt 93.3 kg (205 lb 11.2 oz)   SpO2 99%   BMI 29.51 kg/m²     Physical Examination:  General: Alert and oriented x 3, not in acute distress.  HEENT: Non-icteric sclera. Oropharynx is clear.   Neck: No palpable lymphadenopathy. Neck is supple.  Chest: Clear to auscultation.  Heart: Regular rate and rhythm. S1 S2 normal.   Abdomen: Soft, non tender, non distended, no hepatomegaly, no splenomegaly.   Extremities: Pedal pulses are present. No edema.  Neurological: Grossly intact.   Lymphatics: No palpable lymphadenopathy in the cervical, supraclavicular, axillary, or inguinal regions.  Psych/Depression: Appropriate mood and affect.     Laboratory:    Lab Results   Component Value Date    WBC 2.6 (L) 03/21/2024    RBC 4.81 03/21/2024    HGB 14.6 03/21/2024    HCT 43.7 03/21/2024    MCV 90.9 03/21/2024    MCH 30.4 03/21/2024    MCHC 33.4 03/21/2024    RDW 12.3 03/21/2024    .0 03/21/2024     Lab Results   Component Value Date     03/21/2024    K 3.6 03/21/2024     03/21/2024    CO2 32.0 03/21/2024    BUN 18 03/21/2024    CREATSERUM 1.18 03/21/2024    GLU 96 03/21/2024    CA 9.8 03/21/2024    ALKPHO 53 03/21/2024    ALT 14 03/21/2024    AST 9 03/21/2024    BILT 1.4 (H) 03/21/2024    ALB 4.1 03/21/2024    TP 6.9 03/21/2024       Impression & Plan:    Leukopenia:  Mild chronic isolated leukopenia (no diff) with normal Hgb and Plt count, slightly worse since 2020 with no history of infections.   I reviewed his labs in details and discussed general causes of leukopenia  including but not limited to ETOH use, acute or chronic infection (HIV, HCC) inflammatory conditions (SLE, RA etc) nutritional deficiencies (copper, B12, folate) , medication side effects, familial causes such as benign ethnic neutropenia, and more sinister causes such as bone marrow failure, or malignancies.  Patient is asymptomatic which is re-assuring.  Will proceed with the following work up and patient will return in one week to discuss results: CBC, DIFF, B12, Folate, HIV, Hepatitis, LOTTIE, Quant Igs, Leukemia flow cytometry.     History of prostate cancer:  - stage II T1c N0 M0 , G3+4, iPSA 5.49, s/p definitive XRT on 11/2/2022, current PSA 1.95--> 0.83   - continue follow up with Dr. Beltran for surveillance.       Thank you for the opportunity to participate in the care of Chay Andrade. Please contact me for any questions or concerns.       Joyce Sevilla MD  Hematology Oncology

## 2024-03-28 LAB
ALBUMIN SERPL ELPH-MCNC: 4.26 G/DL (ref 3.75–5.21)
ALBUMIN/GLOB SERPL: 1.44 {RATIO} (ref 1–2)
ALPHA1 GLOB SERPL ELPH-MCNC: 0.27 G/DL (ref 0.19–0.46)
ALPHA2 GLOB SERPL ELPH-MCNC: 0.71 G/DL (ref 0.48–1.05)
B-GLOBULIN SERPL ELPH-MCNC: 0.82 G/DL (ref 0.68–1.23)
GAMMA GLOB SERPL ELPH-MCNC: 1.14 G/DL (ref 0.62–1.7)
KAPPA LC FREE SER-MCNC: 2.14 MG/DL (ref 0.33–1.94)
KAPPA LC FREE/LAMBDA FREE SER NEPH: 1.74 {RATIO} (ref 0.26–1.65)
LAMBDA LC FREE SERPL-MCNC: 1.23 MG/DL (ref 0.57–2.63)
PROT SERPL-MCNC: 7.2 G/DL (ref 5.7–8.2)

## 2024-04-02 LAB
DSDNA IGG SERPL IA-ACNC: 8.1 IU/ML
ENA AB SER QL IA: 0.2 UG/L
ENA AB SER QL IA: NEGATIVE

## 2024-04-03 ENCOUNTER — OFFICE VISIT (OUTPATIENT)
Dept: HEMATOLOGY/ONCOLOGY | Facility: HOSPITAL | Age: 64
End: 2024-04-03
Attending: INTERNAL MEDICINE
Payer: MEDICARE

## 2024-04-03 VITALS
DIASTOLIC BLOOD PRESSURE: 77 MMHG | RESPIRATION RATE: 16 BRPM | OXYGEN SATURATION: 98 % | SYSTOLIC BLOOD PRESSURE: 130 MMHG | TEMPERATURE: 98 F | HEART RATE: 68 BPM | WEIGHT: 207.63 LBS | BODY MASS INDEX: 29.72 KG/M2 | HEIGHT: 70 IN

## 2024-04-03 DIAGNOSIS — Z85.46 HISTORY OF PROSTATE CANCER: ICD-10-CM

## 2024-04-03 DIAGNOSIS — D70.9 NEUTROPENIA, UNSPECIFIED TYPE (HCC): Primary | ICD-10-CM

## 2024-04-03 DIAGNOSIS — R76.8 ELEVATED SERUM IMMUNOGLOBULIN FREE LIGHT CHAIN LEVEL: ICD-10-CM

## 2024-04-03 PROCEDURE — 99214 OFFICE O/P EST MOD 30 MIN: CPT | Performed by: INTERNAL MEDICINE

## 2024-04-03 NOTE — PROGRESS NOTES
Hematology Oncology Follow-up Note    Patient Name: Chay Andrade   YOB: 1960   Medical Record Number: [id number]   CSN: [id number]   Attending Physician: Joyce Sevilla MD     Date of Visit: 4/3/2024     Chief Complaint:  Follow up neutropenia, review labs      Hematologic History:  64 year old Black or  male with history of prostate cancer s/p definitive RT 2022, HTN, HLD, DJD, spinal stenosis referred for evaluation of leukopenia.   Routine labs on 3/21/24 showed WBC 2.6, no differential. Hgb 14.6, Plt 217. Prior labs WBC 3.2 (3/2023), 4.2 (10/2020), 4.7 (2/2017), 3.2 (5/2007)   He feels well and denies any complaints. No fever, night sweats, unintentional weight loss or enlarged nodes. He is trying to eat more healthy to better control his diabetes. Takes vitamin D and MVT daily. No new medications. No recent illness or infection. No mouth sores or gingivitis. No history of chronic or frequent infections. No alcohol use or steroids. No history of liver or autoimmune disease. TSH was normal.    Interval History:  Patient returns with his daughter to review labs.   Feeling well, no new issues or concerns since last visit.   He reports chronic nasal congestion for years, seen by ENT and thought to be secondary to hypertrophic turbinates or allergies  No infections. No fever, cough, sputum, runny nose.        Current Medications:    Current Outpatient Medications:     fluticasone propionate 50 MCG/ACT Nasal Suspension, 1 spray by Nasal route 2 (two) times daily., Disp: 16 g, Rfl: 2    montelukast 10 MG Oral Tab, Take 1 tablet (10 mg total) by mouth nightly., Disp: 90 tablet, Rfl: 2    azelastine 0.1 % Nasal Solution, 2 sprays by Nasal route 2 (two) times daily., Disp: 30 mL, Rfl: 2    atorvastatin 10 MG Oral Tab, Take 1 tablet (10 mg total) by mouth every other day., Disp: 45 tablet, Rfl: 1    amLODIPine 10 MG Oral Tab, Take 1 tablet (10 mg total) by mouth daily., Disp: 90 tablet,  Rfl: 3    Benazepril-hydroCHLOROthiazide 20-12.5 MG Oral Tab, Take 1 tablet by mouth daily., Disp: 90 tablet, Rfl: 1    tamsulosin 0.4 MG Oral Cap, Take 1 capsule (0.4 mg total) by mouth in the morning and 1 capsule (0.4 mg total) before bedtime., Disp: 180 capsule, Rfl: 3    finasteride 5 MG Oral Tab, Take 1 tablet (5 mg total) by mouth daily., Disp: , Rfl:     aspirin 81 MG Oral Tab EC, Take 1 tablet (81 mg total) by mouth daily., Disp: , Rfl:     baclofen 10 MG Oral Tab, Take 1 tablet (10 mg total) by mouth 3 (three) times daily., Disp: , Rfl:     diclofenac 75 MG Oral Tab EC, Take 1 tablet (75 mg total) by mouth daily., Disp: , Rfl:     cholecalciferol 125 MCG (5000 UT) Oral Cap, Take 1 capsule (5,000 Units total) by mouth daily., Disp: , Rfl:     Review of Systems:  10 -point systems reviewed, all negative except as mentioned in the HPI/interval history.     Vital Signs:  /77 (BP Location: Left arm, Patient Position: Sitting, Cuff Size: adult)   Pulse 68   Temp 98.2 °F (36.8 °C) (Oral)   Resp 16   Ht 1.778 m (5' 10\")   Wt 94.2 kg (207 lb 9.6 oz)   SpO2 98%   BMI 29.79 kg/m²     Physical Examination:  General: Aert and oriented x 3, not in acute distress.  Psych:  Mood and affect appropriate  HEENT: Non-icteric sclera. Oropharynx is clear.   Chest: Clear to auscultation.  Heart: Regular rate and rhythm. S1S2 normal.  Abdomen: Soft, non tender  Extremities: No peripheral edema.  Neurological: Grossly intact.     Laboratory:  Lab Results   Component Value Date    WBC 2.9 (L) 03/27/2024    RBC 5.17 03/27/2024    HGB 15.5 03/27/2024    HCT 47.0 03/27/2024    MCV 90.9 03/27/2024    MCH 30.0 03/27/2024    MCHC 33.0 03/27/2024    RDW 12.5 03/27/2024    .0 03/27/2024     Lab Results   Component Value Date     03/21/2024    K 3.6 03/21/2024     03/21/2024    CO2 32.0 03/21/2024    BUN 18 03/21/2024    CREATSERUM 1.18 03/21/2024    GLU 96 03/21/2024    CA 9.8 03/21/2024    ALKPHO 53  03/21/2024    ALT 14 03/21/2024    AST 9 03/21/2024    BILT 1.4 (H) 03/21/2024    ALB 4.26 03/27/2024    TP 7.2 03/27/2024       Impression and Plan:    Leukopenia:  Mild chronic isolated neutropenia with no other cytopenia, no history of chronic/recurrent infections or associated illnesses.    Lab records reviewed and showed borderline low WBC 3-4 dating back to at least 2012.   Work up showed normal B12, folate, TSH, flow cytometry and KARL screen. HIV negative. Protein studies no monoclonal protein but slightly elevated kappa light chains and K/L ratio.   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   No further work up needed at this time. Will continue to monitor his labs periodically q6-12 months.     History of prostate cancer:  - stage II T1c N0 M0 , G3+4, iPSA 5.49, s/p definitive XRT on 11/2/2022, current PSA 1.95--> 0.83   - continue follow up with Dr. Beltran for surveillance.     Elevated serum free kappa light chain:  - minimally elevated kappa light chains and K/L ratio with normal immunofixation likely insignificant and not indicative of a plasma cell disorder.   - probably due to inflammation from his seasonal allergies/chronic congestion.   - will monitor and repeat in 6 months to ensure stability/improvement      RTC 6 months with labs     Orders Placed This Encounter   Procedures    CBC With Differential With Platelet    Protein Electrophoresis w/ LOTTIE & IgA,IgG, IgM Serum        Joyce Sevilla MD   Hematology Oncology   [facility]

## 2024-05-14 ENCOUNTER — APPOINTMENT (OUTPATIENT)
Dept: RADIATION ONCOLOGY | Facility: HOSPITAL | Age: 64
End: 2024-05-14
Attending: RADIOLOGY
Payer: MEDICARE

## 2024-06-04 ENCOUNTER — OFFICE VISIT (OUTPATIENT)
Dept: RADIATION ONCOLOGY | Facility: HOSPITAL | Age: 64
End: 2024-06-04
Attending: RADIOLOGY
Payer: MEDICARE

## 2024-06-04 VITALS
TEMPERATURE: 98 F | SYSTOLIC BLOOD PRESSURE: 125 MMHG | HEART RATE: 82 BPM | RESPIRATION RATE: 18 BRPM | DIASTOLIC BLOOD PRESSURE: 67 MMHG | OXYGEN SATURATION: 97 % | BODY MASS INDEX: 29 KG/M2 | WEIGHT: 203.38 LBS

## 2024-06-04 DIAGNOSIS — C61 PROSTATE CANCER (HCC): Primary | ICD-10-CM

## 2024-06-04 PROCEDURE — 99211 OFF/OP EST MAY X REQ PHY/QHP: CPT

## 2024-06-04 NOTE — PATIENT INSTRUCTIONS
Follow up with Dr. Beltran in 8 months.  Malena will call you to schedule your follow up appointment.   Please call [phone] with any radiation questions.   1 week prior to PSA blood test, please refrain from bike riding, sexual activity and no prostate exams.    Take flomax 1 tablet daily.    Follow up with your urologist with a PSA.

## 2024-06-04 NOTE — PROGRESS NOTES
Nursing Follow-Up Note    Patient: Chay Andrade  YOB: 1960  Age: 64 year old  Radiation Oncologist: Dr. Gabo Beltran  Referring Physician: No ref. provider found  Chief Complaint:   Chief Complaint   Patient presents with    Prostate Cancer     Date: 6/4/2024    Toxicities:   Fatigue Grade 0= None  Constipation Grade 0= None  Diarrhea  Grade 0= None  Dysuria on urination Grade 0= None   Urgency on urination Grade 1= Present  Frequency on urinationGrade 1= Present  Urine stream strength Strong  Urine Incontinence Grade 0= None  Nocturia Grade 1= Present X 2 nightly- tries to limit fluid intake before bed.       Vital Signs: There were no vitals taken for this visit.,   Wt Readings from Last 6 Encounters:   04/03/24 94.2 kg (207 lb 9.6 oz)   03/27/24 93.3 kg (205 lb 11.2 oz)   02/22/24 92.5 kg (204 lb)   02/20/24 92.5 kg (204 lb)   10/17/23 93.9 kg (207 lb)   06/15/23 107 kg (236 lb)     Wt Readings from Last 6 Encounters:   06/04/24 92.3 kg (203 lb 6.4 oz)   04/03/24 94.2 kg (207 lb 9.6 oz)   03/27/24 93.3 kg (205 lb 11.2 oz)   02/22/24 92.5 kg (204 lb)   02/20/24 92.5 kg (204 lb)   10/17/23 93.9 kg (207 lb)             Allergies:  No Known Allergies    Nursing Note:   Patient seen for follow up with Dr. Beltran. Finished RT 11/2/2022. AUA 5, ED 1. PSA drawn 2/9/2024, Dr. Beltran to discuss with patient. Follow up in 8 months. Appointment with urologist in 2 months with a PSA. Following with Dr. Sevilla for neutropenia, next appointment 10/3/2024. Patient currently taking flomax BID. Dr. Beltran recommending 1 tablet daily.

## 2024-06-04 NOTE — PROGRESS NOTES
RADIATION ONCOLOGY NOTE    DATE OF VISIT: 6/4/2024    DIAGNOSIS :  Stage II T1c N0 M0 , G3+4, carcinoma of the prostate status post biopsy pretreatment PSA max 5.49, s/p definitive XRT on 11/2/2022, current PSA 1.95--> 0.83,--> 0.28, doing well, to continue f/u q 6 months     Dear Yanet Martinez, Calin, and colleagues,    As you recall, Chay Andrade is a pleasant 64 year old male, diagnosed with low intermediate risk prostate cancer.  Patient is well-informed and has history of surgical interventions and reluctant to consider surgery.  At baseline, the patient is quite functional.   Patient is on Flomax at baseline, currenlty on BID .  He has a history of chronic back pain.  He is taking Tylenol for LUTS as well.  PSA has severino as below.   PT at baseline has nocturia and drinks plenty of fluids.  He is on Flomax BID with IPSS 6.  Pt is also on  finasteride per Dr Martinez.    He otherwise denies GI symptoms.     Recent Results (from the past 060124 hour(s))   PSA - DIAGNOSTIC [E]    Collection Time: 02/09/24  7:04 AM   Result Value Ref Range    Total PSA  0.28 <=4.00 ng/mL     *Note: Due to a large number of results and/or encounters for the requested time period, some results have not been displayed. A complete set of results can be found in Results Review.     PSA:    Lab Results   Component Value Date    PSA 0.83 08/02/2023    PSA 1.95 02/01/2023         ROS    A 12 Point review of system was obtained and is as above and per HPI and nursing note.         Current Outpatient Medications   Medication Sig Dispense Refill    fluticasone propionate 50 MCG/ACT Nasal Suspension 1 spray by Nasal route 2 (two) times daily. 16 g 2    montelukast 10 MG Oral Tab Take 1 tablet (10 mg total) by mouth nightly. 90 tablet 2    azelastine 0.1 % Nasal Solution 2 sprays by Nasal route 2 (two) times daily. 30 mL 2    atorvastatin 10 MG Oral Tab Take 1 tablet (10 mg total) by mouth every other day. 45 tablet 1    amLODIPine 10 MG  Oral Tab Take 1 tablet (10 mg total) by mouth daily. 90 tablet 3    Benazepril-hydroCHLOROthiazide 20-12.5 MG Oral Tab Take 1 tablet by mouth daily. 90 tablet 1    tamsulosin 0.4 MG Oral Cap Take 1 capsule (0.4 mg total) by mouth in the morning and 1 capsule (0.4 mg total) before bedtime. 180 capsule 3    finasteride 5 MG Oral Tab Take 1 tablet (5 mg total) by mouth daily.      aspirin 81 MG Oral Tab EC Take 1 tablet (81 mg total) by mouth daily.      baclofen 10 MG Oral Tab Take 1 tablet (10 mg total) by mouth 3 (three) times daily.      diclofenac 75 MG Oral Tab EC Take 1 tablet (75 mg total) by mouth daily.      cholecalciferol 125 MCG (5000 UT) Oral Cap Take 1 capsule (5,000 Units total) by mouth daily.         PAIN:   , Pain Score: 0,     ,  ,     ,  ,      ALLERGIES :   No Known Allergies    PAST MEDICAL HISTORY:   has a past medical history of Back problem, Calculus of kidney, High blood pressure, High cholesterol, Osteoarthritis, PONV (postoperative nausea and vomiting), and Visual impairment.    He has no past medical history of Diabetes ([facility]), Difficult intubation, History of blood transfusion, Malignant hyperthermia, Pseudocholinesterase deficiency, Sleep apnea, or Type 1 diabetes mellitus ([facility]).    PAST SURGICAL HISTORY:   has a past surgical history that includes cyst removal (cyst removal on lower spine ); arthroscopy of joint unlisted (Left) (rotator cuff repair and tendon repair ); spine surgery procedure unlisted (CERVICAL FUSION); and colonoscopy.    PAST SOCIAL HISTORY   reports that he has never smoked. He has never used smokeless tobacco. He reports that he does not drink alcohol and does not use drugs.    PAST FAMILY HISTORY   family history includes Diabetes in his mother.    Wt Readings from Last 6 Encounters:   06/04/24 92.3 kg (203 lb 6.4 oz)   04/03/24 94.2 kg (207 lb 9.6 oz)   03/27/24 93.3 kg (205 lb 11.2 oz)   02/22/24 92.5 kg (204 lb)   02/20/24 92.5 kg (204 lb)   10/17/23 93.9 kg (207  lb)        PHYSICAL EXAM  Blood pressure 125/67, pulse 82, temperature 97.7 °F (36.5 °C), temperature source Temporal, resp. rate 18, weight 92.3 kg (203 lb 6.4 oz), SpO2 97%.  PERFORMANCE STATUS  0,  denies PAIN  GENERAL:  Pt is alert and oriented times three in no acute distress.    HEENT:  PERRLA, EOMI,   NECK:  Supple with no  lymphadenopathy.   CHEST:  Clear   ABDOMEN:  Soft with no masses.   EXTREMITIES:  There is no upper or lower extremity edema.    NEUROLOGIC:  Cranial nerves II-XII are intact.  Neuro exam is nonfocal.    COMPLETED TESTS:  I have reviewed the patient's clinical, radiographic, pathologic and laboratory studies.    ASSESSMENT/PLAN    65 yo w/  Stage II T1c N0 M0 , G3+4, carcinoma of the prostate status post biopsy pretreatment PSA max 5.49, s/p definitive XRT on 11/2/2022, current PSA 1.95--> 0.83,--> 0.28, doing well, to continue f/u q 6 months     Pt was instructed to titrate is flomax BID to every day as tolerated.    Pt will continue to follow closely with Dr. Martienz of Urology.    Ideally, pt will have long term PSA control.    As the patient is doing well, I will see patient in  8 months for a routine follow-up visit.     Thank you for allowing me to take care of your patient.  Please do not hesitate to contact me directly.    Gabo Beltran MD, FACRO  Radiation Oncology  [email or web address]  [phone]    6/4/2024

## 2024-08-20 ENCOUNTER — OFFICE VISIT (OUTPATIENT)
Facility: CLINIC | Age: 64
End: 2024-08-20
Payer: MEDICARE

## 2024-08-20 ENCOUNTER — TELEPHONE (OUTPATIENT)
Dept: RADIATION ONCOLOGY | Facility: HOSPITAL | Age: 64
End: 2024-08-20

## 2024-08-20 ENCOUNTER — LAB ENCOUNTER (OUTPATIENT)
Dept: LAB | Facility: HOSPITAL | Age: 64
End: 2024-08-20
Attending: RADIOLOGY
Payer: MEDICARE

## 2024-08-20 VITALS
HEIGHT: 70 IN | OXYGEN SATURATION: 98 % | DIASTOLIC BLOOD PRESSURE: 82 MMHG | BODY MASS INDEX: 28.2 KG/M2 | HEART RATE: 80 BPM | SYSTOLIC BLOOD PRESSURE: 122 MMHG | WEIGHT: 197 LBS

## 2024-08-20 DIAGNOSIS — I10 ESSENTIAL HYPERTENSION: Primary | Chronic | ICD-10-CM

## 2024-08-20 DIAGNOSIS — C61 PROSTATE CANCER (HCC): ICD-10-CM

## 2024-08-20 DIAGNOSIS — E78.5 HYPERLIPIDEMIA, UNSPECIFIED HYPERLIPIDEMIA TYPE: Chronic | ICD-10-CM

## 2024-08-20 DIAGNOSIS — C61 PROSTATE CA (HCC): Primary | ICD-10-CM

## 2024-08-20 DIAGNOSIS — Z85.46 HISTORY OF PROSTATE CANCER: ICD-10-CM

## 2024-08-20 LAB — PSA SERPL-MCNC: 0.15 NG/ML (ref ?–4)

## 2024-08-20 PROCEDURE — 84153 ASSAY OF PSA TOTAL: CPT

## 2024-08-20 PROCEDURE — 99214 OFFICE O/P EST MOD 30 MIN: CPT | Performed by: INTERNAL MEDICINE

## 2024-08-20 PROCEDURE — 36415 COLL VENOUS BLD VENIPUNCTURE: CPT

## 2024-08-20 PROCEDURE — G2211 COMPLEX E/M VISIT ADD ON: HCPCS | Performed by: INTERNAL MEDICINE

## 2024-08-20 RX ORDER — ATORVASTATIN CALCIUM 10 MG/1
10 TABLET, FILM COATED ORAL EVERY OTHER DAY
Qty: 45 TABLET | Refills: 3 | Status: SHIPPED | OUTPATIENT
Start: 2024-08-20

## 2024-08-20 RX ORDER — BENAZEPRIL HYDROCHLORIDE AND HYDROCHLOROTHIAZIDE 20; 12.5 MG/1; MG/1
1 TABLET ORAL DAILY
Qty: 90 TABLET | Refills: 1 | Status: CANCELLED | OUTPATIENT
Start: 2024-08-20

## 2024-08-20 NOTE — PROGRESS NOTES
Chay Andrade is a 64 year old male.  Chief Complaint   Patient presents with    Follow - Up     6 month follow up, is having pain in right shoulder     HPI:   64-year-old pleasant gentleman here for follow-up.  He reports that he is doing well except having occasional neck and shoulder discomfort.  He had cervical spine disease underwent surgery in the past.  Denies any tingling or numbness.  Denies any weakness.  Denies any chest pain or shortness of breath.  Taking his medication regularly.      Current Outpatient Medications   Medication Sig Dispense Refill    atorvastatin 10 MG Oral Tab Take 1 tablet (10 mg total) by mouth every other day. 45 tablet 3    fluticasone propionate 50 MCG/ACT Nasal Suspension 1 spray by Nasal route 2 (two) times daily. 16 g 2    montelukast 10 MG Oral Tab Take 1 tablet (10 mg total) by mouth nightly. 90 tablet 2    azelastine 0.1 % Nasal Solution 2 sprays by Nasal route 2 (two) times daily. 30 mL 2    amLODIPine 10 MG Oral Tab Take 1 tablet (10 mg total) by mouth daily. 90 tablet 3    Benazepril-hydroCHLOROthiazide 20-12.5 MG Oral Tab Take 1 tablet by mouth daily. 90 tablet 1    tamsulosin 0.4 MG Oral Cap Take 1 capsule (0.4 mg total) by mouth in the morning and 1 capsule (0.4 mg total) before bedtime. 180 capsule 3    finasteride 5 MG Oral Tab Take 1 tablet (5 mg total) by mouth daily.      aspirin 81 MG Oral Tab EC Take 1 tablet (81 mg total) by mouth daily.      baclofen 10 MG Oral Tab Take 1 tablet (10 mg total) by mouth 3 (three) times daily.      diclofenac 75 MG Oral Tab EC Take 1 tablet (75 mg total) by mouth daily.      cholecalciferol 125 MCG (5000 UT) Oral Cap Take 1 capsule (5,000 Units total) by mouth daily.        Past Medical History:    Back problem    Calculus of kidney    20 yrs ago [facility]     High blood pressure    High cholesterol    Osteoarthritis    PONV (postoperative nausea and vomiting)    Visual impairment    glasses      Past Surgical History:   Procedure  Laterality Date    Arthroscopy of joint unlisted Left     rotator cuff repair and tendon repair     Colonoscopy      Cyst removal      cyst removal on lower spine     Spine surgery procedure unlisted      CERVICAL FUSION      Social History:  Social History     Socioeconomic History    Marital status:    Tobacco Use    Smoking status: Never    Smokeless tobacco: Never   Vaping Use    Vaping status: Never Used   Substance and Sexual Activity    Alcohol use: Never    Drug use: Never    Sexual activity: Yes      Family History   Problem Relation Age of Onset    Diabetes Mother       No Known Allergies     REVIEW OF SYSTEMS:   Review of Systems   Review of Systems   Constitutional: Negative for activity change, appetite change and fever.   HENT: Negative for congestion and voice change.    Respiratory: Negative for cough and shortness of breath.    Cardiovascular: Negative for chest pain.   Gastrointestinal: Negative for abdominal distention, abdominal pain and vomiting.   Genitourinary: Negative for hematuria.   Skin: Negative for wound.   Psychiatric/Behavioral: Negative for behavioral problems.   Wt Readings from Last 5 Encounters:   08/20/24 197 lb (89.4 kg)   06/04/24 203 lb 6.4 oz (92.3 kg)   04/03/24 207 lb 9.6 oz (94.2 kg)   03/27/24 205 lb 11.2 oz (93.3 kg)   02/22/24 204 lb (92.5 kg)     Body mass index is 28.27 kg/m².      EXAM:   /82   Pulse 80   Ht 5' 10\" (1.778 m)   Wt 197 lb (89.4 kg)   SpO2 98%   BMI 28.27 kg/m²   Physical Exam   Constitutional:       Appearance: Normal appearance.    Eyes:      Conjunctiva/sclera: Conjunctivae normal.   Cardiovascular:      Rate and Rhythm: Normal rate and regular rhythm.      Heart sounds: Normal heart sounds. No murmur heard.  Pulmonary:      Effort: Pulmonary effort is normal.      Breath sounds: Normal breath sounds. No rhonchi or rales.   Shoulder-no erythema or warmth.  Range of motion is intact.  Abdominal:      General: Bowel sounds are normal.       Palpations: Abdomen is soft.      Tenderness: There is no abdominal tenderness.   Musculoskeletal:      Cervical back: Neck supple.      Right lower leg: No edema.      Left lower leg: No edema.   Skin:     General: Skin is warm and dry.   Neurological:      General: No focal deficit present.      Mental Status: He is alert and oriented to person, place, and time. Mental status is at baseline.   Psychiatric:         Mood and Affect: Mood normal.         Behavior: Behavior normal.       ASSESSMENT AND PLAN:   1. Essential hypertension  Lab Results   Component Value Date    CREATSERUM 1.18 03/21/2024    TSH 1.161 03/21/2024     Will continue to monitor blood pressure.  Encouraged patient to avoid salt.  Continue current medical regimen.      2. Hyperlipidemia, unspecified hyperlipidemia type  Lab Results   Component Value Date    LDL 88 03/21/2024    AST 9 03/21/2024    ALT 14 03/21/2024      Encouraged patient to eat healthy.  Avoid fat fried foods and increase activity as tolerated.  We will monitor lipid profile and liver function test.      3. History of prostate cancer  Continue with surveillance PSA.  I have discussed with him regarding this.  He informed me that he has already orders from another MD.  He will complete it as soon as possible.    Plan: Labs reviewed.  Medication prescribed.  Shoulder discomfort most likely referred pain from cervical spine.  I have informed him regarding this.  If there is any worsening symptoms, he will contact me.  If everything is good, I will see him back in 6 months      The patient indicates understanding of these issues and agrees to the plan.  No follow-ups on file.    This note was prepared using Dragon Medical voice recognition dictation software. As a result errors may occur. When identified these errors have been corrected. While every attempt is made to correct errors during dictation discrepancies may still exist.

## 2024-08-20 NOTE — TELEPHONE ENCOUNTER
RN called patient to pass the following message along for Dr. Beltran:    \"Can you pls let pt know his PSA looks good and I will put in another PSA order for 6 months with f/u, thx\"    Patient states his understanding. Malena, our , notified to schedule patient for 6 month follow up.

## 2024-09-10 RX ORDER — BENAZEPRIL HYDROCHLORIDE AND HYDROCHLOROTHIAZIDE 20; 12.5 MG/1; MG/1
1 TABLET ORAL DAILY
Qty: 90 TABLET | Refills: 3 | Status: SHIPPED | OUTPATIENT
Start: 2024-09-10

## 2024-09-10 NOTE — TELEPHONE ENCOUNTER
Refill passed per [facility] protocol.  Requested Prescriptions   Pending Prescriptions Disp Refills    BENAZEPRIL-HYDROCHLOROTHIAZIDE 20-12.5 MG Oral Tab [Pharmacy Med Name: BENAZEP/HCTZ TAB 20-12.5] 90 tablet 1     Sig: TAKE 1 TABLET DAILY       Hypertension Medications Protocol Passed - 9/6/2024  9:11 PM        Passed - CMP or BMP in past 12 months        Passed - Last BP reading less than 140/90     BP Readings from Last 1 Encounters:   08/20/24 122/82               Passed - In person appointment or virtual visit in the past 12 mos or appointment in next 3 mos     Recent Outpatient Visits              3 weeks ago Essential hypertension    [facility] Guilherme Borrero MD    Office Visit    3 months ago Prostate cancer (HCC)    [facility] - Rad/Onc Beltran, Gabo Ovalles MD    Office Visit    5 months ago Neutropenia, unspecified type (HCC)    [facility] Hematology Oncology Joyce Sevilla MD    Office Visit    5 months ago Leukopenia, unspecified type    [facility] Hematology Oncology Joyce Sevilla MD    Office Visit    6 months ago Asymmetrical sensorineural hearing loss    [facility]    Office Visit          Future Appointments         Provider Department Appt Notes    In 3 weeks Joyce Sevilla MD [facility] Hematology Oncology follow up visit.cl    In 5 months Guilherme Borrero MD [facility], [address], [place] medicare px                    Passed - EGFRCR or GFRAA > 50     GFR Evaluation  EGFRCR: 68 , resulted on 3/21/2024             Future Appointments         Provider Department Appt Notes    In 3 weeks Joyce Sevilla MD [facility] Hematology Oncology follow up visit.cl    In 5 months Guilherme Borrero MD [facility], [address], [place] medicare px          Recent Outpatient Visits              3  weeks ago Essential hypertension    [facility], Guilherme Arroyo MD    Office Visit    3 months ago Prostate cancer (HCC)    [facility] - Rad/Onc Beltran, Gabo Ovalles MD    Office Visit    5 months ago Neutropenia, unspecified type (HCC)    [facility] Hematology Oncology Joyce Sevilla MD    Office Visit    5 months ago Leukopenia, unspecified type    [facility] Hematology Oncology Joyce Sevilla MD    Office Visit    6 months ago Asymmetrical sensorineural hearing loss    [facility], [facility]    Office Visit

## 2024-09-30 ENCOUNTER — LAB ENCOUNTER (OUTPATIENT)
Dept: LAB | Facility: HOSPITAL | Age: 64
End: 2024-09-30
Attending: INTERNAL MEDICINE
Payer: MEDICARE

## 2024-09-30 DIAGNOSIS — R76.8 ELEVATED SERUM IMMUNOGLOBULIN FREE LIGHT CHAIN LEVEL: ICD-10-CM

## 2024-09-30 DIAGNOSIS — D70.9 NEUTROPENIA, UNSPECIFIED TYPE (HCC): ICD-10-CM

## 2024-09-30 LAB
BASOPHILS # BLD AUTO: 0.05 X10(3) UL (ref 0–0.2)
BASOPHILS NFR BLD AUTO: 1.4 %
DEPRECATED RDW RBC AUTO: 42.2 FL (ref 35.1–46.3)
EOSINOPHIL # BLD AUTO: 0.1 X10(3) UL (ref 0–0.7)
EOSINOPHIL NFR BLD AUTO: 2.8 %
ERYTHROCYTE [DISTWIDTH] IN BLOOD BY AUTOMATED COUNT: 12.5 % (ref 11–15)
HCT VFR BLD AUTO: 45.3 %
HGB BLD-MCNC: 15.3 G/DL
IGA SERPL-MCNC: 247.5 MG/DL (ref 40–350)
IGM SERPL-MCNC: 23.9 MG/DL (ref 50–300)
IMM GRANULOCYTES # BLD AUTO: 0 X10(3) UL (ref 0–1)
IMM GRANULOCYTES NFR BLD: 0 %
IMMUNOGLOBULIN PNL SER-MCNC: 1129 MG/DL (ref 650–1600)
LYMPHOCYTES # BLD AUTO: 1.53 X10(3) UL (ref 1–4)
LYMPHOCYTES NFR BLD AUTO: 43.3 %
MCH RBC QN AUTO: 31 PG (ref 26–34)
MCHC RBC AUTO-ENTMCNC: 33.8 G/DL (ref 31–37)
MCV RBC AUTO: 91.7 FL
MONOCYTES # BLD AUTO: 0.53 X10(3) UL (ref 0.1–1)
MONOCYTES NFR BLD AUTO: 15 %
NEUTROPHILS # BLD AUTO: 1.32 X10 (3) UL (ref 1.5–7.7)
NEUTROPHILS # BLD AUTO: 1.32 X10(3) UL (ref 1.5–7.7)
NEUTROPHILS NFR BLD AUTO: 37.5 %
PLATELET # BLD AUTO: 203 10(3)UL (ref 150–450)
RBC # BLD AUTO: 4.94 X10(6)UL
WBC # BLD AUTO: 3.5 X10(3) UL (ref 4–11)

## 2024-09-30 PROCEDURE — 86334 IMMUNOFIX E-PHORESIS SERUM: CPT

## 2024-09-30 PROCEDURE — 82784 ASSAY IGA/IGD/IGG/IGM EACH: CPT

## 2024-09-30 PROCEDURE — 36415 COLL VENOUS BLD VENIPUNCTURE: CPT

## 2024-09-30 PROCEDURE — 85025 COMPLETE CBC W/AUTO DIFF WBC: CPT

## 2024-09-30 PROCEDURE — 84165 PROTEIN E-PHORESIS SERUM: CPT

## 2024-09-30 PROCEDURE — 83521 IG LIGHT CHAINS FREE EACH: CPT

## 2024-10-01 LAB
ALBUMIN SERPL ELPH-MCNC: 4.11 G/DL (ref 3.75–5.21)
ALBUMIN/GLOB SERPL: 1.65 {RATIO} (ref 1–2)
ALPHA1 GLOB SERPL ELPH-MCNC: 0.24 G/DL (ref 0.19–0.46)
ALPHA2 GLOB SERPL ELPH-MCNC: 0.5 G/DL (ref 0.48–1.05)
B-GLOBULIN SERPL ELPH-MCNC: 0.72 G/DL (ref 0.68–1.23)
GAMMA GLOB SERPL ELPH-MCNC: 1.03 G/DL (ref 0.62–1.7)
KAPPA LC FREE SER-MCNC: 2.54 MG/DL (ref 0.33–1.94)
KAPPA LC FREE/LAMBDA FREE SER NEPH: 1.52 {RATIO} (ref 0.26–1.65)
LAMBDA LC FREE SERPL-MCNC: 1.67 MG/DL (ref 0.57–2.63)
PROT SERPL-MCNC: 6.6 G/DL (ref 5.7–8.2)

## 2024-10-03 ENCOUNTER — OFFICE VISIT (OUTPATIENT)
Dept: HEMATOLOGY/ONCOLOGY | Facility: HOSPITAL | Age: 64
End: 2024-10-03
Attending: INTERNAL MEDICINE
Payer: MEDICARE

## 2024-10-03 VITALS
DIASTOLIC BLOOD PRESSURE: 75 MMHG | OXYGEN SATURATION: 99 % | BODY MASS INDEX: 28.46 KG/M2 | HEIGHT: 70 IN | TEMPERATURE: 98 F | HEART RATE: 76 BPM | WEIGHT: 198.81 LBS | SYSTOLIC BLOOD PRESSURE: 131 MMHG | RESPIRATION RATE: 16 BRPM

## 2024-10-03 DIAGNOSIS — D70.9 NEUTROPENIA, UNSPECIFIED TYPE (HCC): Primary | ICD-10-CM

## 2024-10-03 DIAGNOSIS — R76.8 ELEVATED SERUM IMMUNOGLOBULIN FREE LIGHT CHAIN LEVEL: ICD-10-CM

## 2024-10-03 DIAGNOSIS — Z85.46 HISTORY OF PROSTATE CANCER: ICD-10-CM

## 2024-10-03 PROCEDURE — 99214 OFFICE O/P EST MOD 30 MIN: CPT | Performed by: INTERNAL MEDICINE

## 2024-10-03 NOTE — PROGRESS NOTES
Hematology Oncology Follow-up Note    Patient Name: Chay Andrade   YOB: 1960   Medical Record Number: [id number]   CSN: [id number]   Attending Physician: Joyce Sevilla MD     Date of Visit: 10/3/2024     Chief Complaint:  Follow up neutropenia, review labs      Hematologic History:  64 year old Black or  male with history of prostate cancer s/p definitive RT 2022, HTN, HLD, DJD, spinal stenosis referred for evaluation of leukopenia.   Routine labs on 3/21/24 showed WBC 2.6, no differential. Hgb 14.6, Plt 217. Prior labs WBC 3.2 (3/2023), 4.2 (10/2020), 4.7 (2/2017), 3.2 (5/2007)   He feels well and denies any complaints. No fever, night sweats, unintentional weight loss or enlarged nodes. He is trying to eat more healthy to better control his diabetes. Takes vitamin D and MVT daily. No new medications. No recent illness or infection. No mouth sores or gingivitis. No history of chronic or frequent infections. No alcohol use or steroids. No history of liver or autoimmune disease. TSH was normal.    Interval History:  Here for 6 months follow up and review of labs. He is accompanied by his daughter.   Feels more tired and weak lately due to more working harder and more exertion. Also noted bruising over his arms after heavy lifting.   Denies any fever, chills, unintentional weight loss, infections.   He reports chronic nasal congestion for years, seen by ENT and thought to be secondary to hypertrophic turbinates or allergies      Current Medications:    Current Outpatient Medications:     Benazepril-hydroCHLOROthiazide 20-12.5 MG Oral Tab, Take 1 tablet by mouth daily., Disp: 90 tablet, Rfl: 3    atorvastatin 10 MG Oral Tab, Take 1 tablet (10 mg total) by mouth every other day., Disp: 45 tablet, Rfl: 3    fluticasone propionate 50 MCG/ACT Nasal Suspension, 1 spray by Nasal route 2 (two) times daily., Disp: 16 g, Rfl: 2    montelukast 10 MG Oral Tab, Take 1 tablet (10 mg total) by  mouth nightly., Disp: 90 tablet, Rfl: 2    azelastine 0.1 % Nasal Solution, 2 sprays by Nasal route 2 (two) times daily., Disp: 30 mL, Rfl: 2    amLODIPine 10 MG Oral Tab, Take 1 tablet (10 mg total) by mouth daily., Disp: 90 tablet, Rfl: 3    tamsulosin 0.4 MG Oral Cap, Take 1 capsule (0.4 mg total) by mouth in the morning and 1 capsule (0.4 mg total) before bedtime., Disp: 180 capsule, Rfl: 3    finasteride 5 MG Oral Tab, Take 1 tablet (5 mg total) by mouth daily., Disp: , Rfl:     aspirin 81 MG Oral Tab EC, Take 1 tablet (81 mg total) by mouth daily., Disp: , Rfl:     baclofen 10 MG Oral Tab, Take 1 tablet (10 mg total) by mouth 3 (three) times daily., Disp: , Rfl:     diclofenac 75 MG Oral Tab EC, Take 1 tablet (75 mg total) by mouth daily., Disp: , Rfl:     cholecalciferol 125 MCG (5000 UT) Oral Cap, Take 1 capsule (5,000 Units total) by mouth daily., Disp: , Rfl:     Review of Systems:  10 -point systems reviewed, all negative except as mentioned in the HPI/interval history.     Vital Signs:  /75 (BP Location: Left arm, Patient Position: Sitting, Cuff Size: adult)   Pulse 76   Temp 98.1 °F (36.7 °C) (Oral)   Resp 16   Ht 1.778 m (5' 10\")   Wt 90.2 kg (198 lb 12.8 oz)   SpO2 99%   BMI 28.52 kg/m²     Physical Examination:  General: Aert and oriented x 3, not in acute distress.  Psych:  Mood and affect appropriate  HEENT: Non-icteric sclera. Oropharynx is clear.   Chest: Clear to auscultation.  Heart: Regular rate and rhythm. S1S2 normal.  Abdomen: Soft, non tender  Extremities: No peripheral edema.  Neurological: Grossly intact.     Laboratory:  Lab Results   Component Value Date    WBC 3.5 (L) 09/30/2024    RBC 4.94 09/30/2024    HGB 15.3 09/30/2024    HCT 45.3 09/30/2024    MCV 91.7 09/30/2024    MCH 31.0 09/30/2024    MCHC 33.8 09/30/2024    RDW 12.5 09/30/2024    .0 09/30/2024     Lab Results   Component Value Date/Time    WBC 3.5 (L) 09/30/2024 07:27 AM    WBC 2.9 (L) 03/27/2024 10:47  AM    WBC 2.6 (L) 03/21/2024 08:55 AM    WBC 3.2 (L) 03/16/2023 06:54 AM    WBC 4.2 10/28/2020 06:36 AM     Lab Results   Component Value Date/Time    NE 1.32 (L) 09/30/2024 07:27 AM    NE 1.35 (L) 03/27/2024 10:47 AM    NE 1.81 10/28/2020 06:36 AM     Lab Results   Component Value Date     03/21/2024    K 3.6 03/21/2024     03/21/2024    CO2 32.0 03/21/2024    BUN 18 03/21/2024    CREATSERUM 1.18 03/21/2024    GLU 96 03/21/2024    CA 9.8 03/21/2024    ALKPHO 53 03/21/2024    ALT 14 03/21/2024    AST 9 03/21/2024    BILT 1.4 (H) 03/21/2024    ALB 4.11 09/30/2024    TP 6.6 09/30/2024      Latest Reference Range & Units 03/27/24 10:47 09/30/24 07:27   KAPPA FREE LIGHT CHAIN 0.330 - 1.940 mg/dL 2.143 (H) 2.541 (H)   LAMBDA FREE LIGHT CHAIN 0.571 - 2.630 mg/dL 1.229 1.667   KAPPA/LAMBDA FLC RATIO 0.26 - 1.65  1.74 (H) 1.52   PROTEIN, TOTAL 5.7 - 8.2 g/dL 7.2 6.6   Albumin 3.75 - 5.21 g/dL 4.26 4.11   ALPHA-1-GLOBULINS 0.19 - 0.46 g/dL 0.27 0.24   ALPHA-2-GLOBULINS 0.48 - 1.05 g/dL 0.71 0.50   BETA GLOBULINS 0.68 - 1.23 g/dL 0.82 0.72   GAMMA GLOBULINS 0.62 - 1.70 g/dL 1.14 1.03   ALBUMIN/GLOBULIN RATIO 1.00 - 2.00  1.44 1.65   IMMUNOFIXATION  Essentially normal.    No evidence of monoclonal proteins identified. Essentially normal.    No evidence of monoclonal proteins identified.         Impression and Plan:    Leukopenia:  - mild chronic isolated neutropenia with no history of chronic/recurrent infections or associated illnesses.    - borderline low WBC 3-4 dating back to at least 2012 now with mild neutropenia.   - work up showed normal B12, folate, TSH, flow cytometry and KARL screen. HIV negative. Protein studies no monoclonal protein but slightly elevated kappa light chains and K/L ratio.   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   - repeat labs stable. Remains asymptomatic. no further work up needed at this time.   - continue  to monitor and repeat labs annually.      History of prostate cancer:  - stage II T1c N0 M0 , G3+4, iPSA 5.49, s/p definitive XRT on 11/2/2022, current PSA 1.95--> 0.83   - continue follow up with Dr. Beltran for surveillance.     Elevated serum free kappa light chain:  - minimally elevated kappa light chains and K/L ratio with normal immunofixation likely insignificant and not indicative of a plasma cell disorder.   - probably due to inflammation from his seasonal allergies/chronic congestion.   - repeat protein studies with stable free kappa light chains, normal FLC ration and immunofixation.   - this is insignificant, continue to monitor    Return in about 1 year (around 10/3/2025).       Joyce Sevilla MD  Hematology Oncology

## 2024-10-11 RX ORDER — MONTELUKAST SODIUM 10 MG/1
10 TABLET ORAL NIGHTLY
Qty: 90 TABLET | Refills: 0 | Status: SHIPPED | OUTPATIENT
Start: 2024-10-11

## 2025-01-13 RX ORDER — MONTELUKAST SODIUM 10 MG/1
10 TABLET ORAL NIGHTLY
Qty: 90 TABLET | Refills: 0 | Status: SHIPPED | OUTPATIENT
Start: 2025-01-13

## 2025-01-15 RX ORDER — AMLODIPINE BESYLATE 10 MG/1
10 TABLET ORAL DAILY
Qty: 90 TABLET | Refills: 3 | OUTPATIENT
Start: 2025-01-15

## 2025-01-15 RX ORDER — AMLODIPINE BESYLATE 10 MG/1
10 TABLET ORAL DAILY
Qty: 90 TABLET | Refills: 3 | Status: SHIPPED | OUTPATIENT
Start: 2025-01-15

## 2025-01-15 NOTE — TELEPHONE ENCOUNTER
Refill passed per [facility] protocols.    Requested Prescriptions   Pending Prescriptions Disp Refills    AMLODIPINE 10 MG Oral Tab [Pharmacy Med Name: AMLODIPINE TAB 10MG] 90 tablet 3     Sig: TAKE 1 TABLET DAILY       Hypertension Medications Protocol Passed - 1/15/2025  9:30 AM        Passed - CMP or BMP in past 12 months        Passed - Last BP reading less than 140/90     BP Readings from Last 1 Encounters:   10/03/24 131/75               Passed - In person appointment or virtual visit in the past 12 mos or appointment in next 3 mos     Recent Outpatient Visits              3 months ago Neutropenia, unspecified type (HCC)    [facility] Hematology Oncology Joyce Sevilla MD    Office Visit    4 months ago Essential hypertension    [facility], [facility], [place] Guilherme Borrero MD    Office Visit    7 months ago Prostate cancer (HCC)    [facility] - Rad/Onc Gabo Beltran MD    Office Visit    9 months ago Neutropenia, unspecified type (HCC)    [facility] Hematology Oncology Joyce Sevilla MD    Office Visit    9 months ago Leukopenia, unspecified type    [facility] Hematology Oncology Joyce Sevilla MD    Office Visit          Future Appointments         Provider Department Appt Notes    In 1 month Guilherme Borrero MD [facility], [facility], [place] medicare px    In 1 month Gabo Beltran MD; [facility] RAD ONC RN [facility] - Rad/Onc f/u go over psa    In 8 months Joyce Sevilla MD [facility] Hematology Oncology [place] follow up visit.cl                    Passed - EGFRCR or GFRAA > 50     GFR Evaluation  EGFRCR: 68 , resulted on 3/21/2024          Passed - Medication is active on med list

## 2025-02-17 ENCOUNTER — LAB ENCOUNTER (OUTPATIENT)
Dept: LAB | Facility: HOSPITAL | Age: 65
End: 2025-02-17
Attending: RADIOLOGY
Payer: MEDICARE

## 2025-02-17 DIAGNOSIS — C61 PROSTATE CA (HCC): ICD-10-CM

## 2025-02-17 LAB — PSA SERPL-MCNC: 0.1 NG/ML (ref ?–4)

## 2025-02-17 PROCEDURE — 84153 ASSAY OF PSA TOTAL: CPT

## 2025-02-17 PROCEDURE — 36415 COLL VENOUS BLD VENIPUNCTURE: CPT

## 2025-02-19 ENCOUNTER — LAB ENCOUNTER (OUTPATIENT)
Dept: LAB | Age: 65
End: 2025-02-19
Attending: INTERNAL MEDICINE
Payer: MEDICARE

## 2025-02-19 ENCOUNTER — OFFICE VISIT (OUTPATIENT)
Dept: INTERNAL MEDICINE CLINIC | Facility: CLINIC | Age: 65
End: 2025-02-19
Payer: MEDICARE

## 2025-02-19 VITALS
WEIGHT: 211 LBS | HEART RATE: 66 BPM | DIASTOLIC BLOOD PRESSURE: 82 MMHG | SYSTOLIC BLOOD PRESSURE: 122 MMHG | BODY MASS INDEX: 30.21 KG/M2 | OXYGEN SATURATION: 99 % | HEIGHT: 70 IN

## 2025-02-19 DIAGNOSIS — E78.5 HYPERLIPIDEMIA, UNSPECIFIED HYPERLIPIDEMIA TYPE: Chronic | ICD-10-CM

## 2025-02-19 DIAGNOSIS — I10 ESSENTIAL HYPERTENSION: Primary | Chronic | ICD-10-CM

## 2025-02-19 DIAGNOSIS — R73.02 IMPAIRED GLUCOSE TOLERANCE: ICD-10-CM

## 2025-02-19 DIAGNOSIS — Z85.46 HISTORY OF PROSTATE CANCER: ICD-10-CM

## 2025-02-19 DIAGNOSIS — M43.16 SPONDYLOLISTHESIS, LUMBAR REGION: ICD-10-CM

## 2025-02-19 DIAGNOSIS — N40.0 BENIGN PROSTATIC HYPERPLASIA WITHOUT LOWER URINARY TRACT SYMPTOMS: ICD-10-CM

## 2025-02-19 DIAGNOSIS — M47.12 CERVICAL SPONDYLOSIS WITH MYELOPATHY: ICD-10-CM

## 2025-02-19 LAB
ALBUMIN SERPL-MCNC: 4.4 G/DL (ref 3.2–4.8)
ALBUMIN/GLOB SERPL: 1.8 {RATIO} (ref 1–2)
ALP LIVER SERPL-CCNC: 54 U/L
ALT SERPL-CCNC: 27 U/L
ANION GAP SERPL CALC-SCNC: 8 MMOL/L (ref 0–18)
AST SERPL-CCNC: 18 U/L (ref ?–34)
BILIRUB SERPL-MCNC: 1.2 MG/DL (ref 0.2–1.1)
BUN BLD-MCNC: 20 MG/DL (ref 9–23)
BUN/CREAT SERPL: 16.8 (ref 10–20)
CALCIUM BLD-MCNC: 9.6 MG/DL (ref 8.7–10.4)
CHLORIDE SERPL-SCNC: 105 MMOL/L (ref 98–112)
CHOLEST SERPL-MCNC: 152 MG/DL (ref ?–200)
CO2 SERPL-SCNC: 30 MMOL/L (ref 21–32)
CREAT BLD-MCNC: 1.19 MG/DL
DEPRECATED RDW RBC AUTO: 42.8 FL (ref 35.1–46.3)
EGFRCR SERPLBLD CKD-EPI 2021: 67 ML/MIN/1.73M2 (ref 60–?)
ERYTHROCYTE [DISTWIDTH] IN BLOOD BY AUTOMATED COUNT: 12.5 % (ref 11–15)
EST. AVERAGE GLUCOSE BLD GHB EST-MCNC: 111 MG/DL (ref 68–126)
FASTING PATIENT LIPID ANSWER: YES
FASTING STATUS PATIENT QL REPORTED: YES
GLOBULIN PLAS-MCNC: 2.5 G/DL (ref 2–3.5)
GLUCOSE BLD-MCNC: 99 MG/DL (ref 70–99)
HBA1C MFR BLD: 5.5 % (ref ?–5.7)
HCT VFR BLD AUTO: 45.9 %
HDLC SERPL-MCNC: 63 MG/DL (ref 40–59)
HGB BLD-MCNC: 14.9 G/DL
LDLC SERPL CALC-MCNC: 77 MG/DL (ref ?–100)
MCH RBC QN AUTO: 30.1 PG (ref 26–34)
MCHC RBC AUTO-ENTMCNC: 32.5 G/DL (ref 31–37)
MCV RBC AUTO: 92.7 FL
NONHDLC SERPL-MCNC: 89 MG/DL (ref ?–130)
OSMOLALITY SERPL CALC.SUM OF ELEC: 299 MOSM/KG (ref 275–295)
PLATELET # BLD AUTO: 185 10(3)UL (ref 150–450)
POTASSIUM SERPL-SCNC: 4 MMOL/L (ref 3.5–5.1)
PROT SERPL-MCNC: 6.9 G/DL (ref 5.7–8.2)
RBC # BLD AUTO: 4.95 X10(6)UL
SODIUM SERPL-SCNC: 143 MMOL/L (ref 136–145)
TRIGL SERPL-MCNC: 55 MG/DL (ref 30–149)
TSI SER-ACNC: 0.79 UIU/ML (ref 0.55–4.78)
VLDLC SERPL CALC-MCNC: 9 MG/DL (ref 0–30)
WBC # BLD AUTO: 2.6 X10(3) UL (ref 4–11)

## 2025-02-19 PROCEDURE — 80053 COMPREHEN METABOLIC PANEL: CPT | Performed by: INTERNAL MEDICINE

## 2025-02-19 PROCEDURE — 84443 ASSAY THYROID STIM HORMONE: CPT | Performed by: INTERNAL MEDICINE

## 2025-02-19 PROCEDURE — 85027 COMPLETE CBC AUTOMATED: CPT | Performed by: INTERNAL MEDICINE

## 2025-02-19 PROCEDURE — 36415 COLL VENOUS BLD VENIPUNCTURE: CPT | Performed by: INTERNAL MEDICINE

## 2025-02-19 PROCEDURE — 80061 LIPID PANEL: CPT | Performed by: INTERNAL MEDICINE

## 2025-02-19 PROCEDURE — 83036 HEMOGLOBIN GLYCOSYLATED A1C: CPT | Performed by: INTERNAL MEDICINE

## 2025-02-19 RX ORDER — PREGABALIN 75 MG/1
75 CAPSULE ORAL 2 TIMES DAILY PRN
COMMUNITY
Start: 2025-02-04

## 2025-02-19 NOTE — PROGRESS NOTES
Subjective:   Chay Andrade is a 64 year old male who presents for a MA AHA (Medicare Advantage Annual Health Assessment) and scheduled follow up of multiple significant but stable problems.   History of Present Illness    Gentleman here for Medicare advantage super visit.  He reports that he is doing well.  His pain is reasonably well-controlled on current medications.  No abuse no depression no falls reported.  He follows up with urology as outside of [facility].  I have discussed with him again today regarding this.  He is requesting for a form to be filled for not shoveling the snow.  Because of his spine disease, I think it is reasonable.  History/Other:   Fall Risk Assessment:   He has been screened for Falls and is High Risk. Fall Prevention information provided to patient in After Visit Summary.    Do you feel unsteady when standing or walking?: (Patient-Rptd) Yes  Do you worry about falling?: (Patient-Rptd) No  Have you fallen in the past year?: (Patient-Rptd) No     Cognitive Assessment:   He had a completely normal cognitive assessment - see flowsheet entries     Functional Ability/Status:   Chay Andrade has some abnormal functions as listed below:  He has Hearing problems based on screening of functional status.He has Vision problems based on screening of functional status. He has problems with Memory based on screening of functional status.       Depression Screening (PHQ):  PHQ-2 SCORE: 0  , done 2/12/2025        <5 minutes spent screening and counseling for depression    Advanced Directives:   He does NOT have a Living Will. [Do you have a living will?: (Patient-Rptd) No]  He has a Power of  for Health Care on file in [facility].  Discussed Advance Care Planning with patient (and family/surrogate if present). Standard forms made available to patient in After Visit Summary.      Patient Active Problem List   Diagnosis    Spondylolisthesis, lumbar region    Essential hypertension     Hyperlipidemia    Cervical spondylosis with myelopathy    Impaired glucose tolerance    History of prostate cancer    Benign prostatic hyperplasia without lower urinary tract symptoms     Allergies:  He has No Known Allergies.    Current Medications:  Outpatient Medications Marked as Taking for the 2/19/25 encounter (Office Visit) with Guilherme Borrero MD   Medication Sig    pregabalin 75 MG Oral Cap Take 1 capsule (75 mg total) by mouth 2 (two) times daily as needed.    amLODIPine 10 MG Oral Tab Take 1 tablet (10 mg total) by mouth daily.    MONTELUKAST 10 MG Oral Tab TAKE 1 TABLET NIGHTLY    Benazepril-hydroCHLOROthiazide 20-12.5 MG Oral Tab Take 1 tablet by mouth daily.    atorvastatin 10 MG Oral Tab Take 1 tablet (10 mg total) by mouth every other day.    fluticasone propionate 50 MCG/ACT Nasal Suspension 1 spray by Nasal route 2 (two) times daily.    azelastine 0.1 % Nasal Solution 2 sprays by Nasal route 2 (two) times daily.    tamsulosin 0.4 MG Oral Cap Take 1 capsule (0.4 mg total) by mouth in the morning and 1 capsule (0.4 mg total) before bedtime.    finasteride 5 MG Oral Tab Take 1 tablet (5 mg total) by mouth daily.    aspirin 81 MG Oral Tab EC Take 1 tablet (81 mg total) by mouth daily.    baclofen 10 MG Oral Tab Take 1 tablet (10 mg total) by mouth 3 (three) times daily.    diclofenac 75 MG Oral Tab EC Take 1 tablet (75 mg total) by mouth daily.    cholecalciferol 125 MCG (5000 UT) Oral Cap Take 1 capsule (5,000 Units total) by mouth daily.       Medical History:  He  has a past medical history of Back problem, Calculus of kidney, High blood pressure, High cholesterol, Osteoarthritis, PONV (postoperative nausea and vomiting), and Visual impairment.  Surgical History:  He  has a past surgical history that includes cyst removal; arthroscopy of joint unlisted (Left); spine surgery procedure unlisted; and colonoscopy.   Family History:  His family history includes Diabetes in his mother.  Social  History:  He  reports that he has never smoked. He has never used smokeless tobacco. He reports that he does not drink alcohol and does not use drugs.    Tobacco:  He has never smoked tobacco.    CAGE Alcohol Screen:   CAGE screening score of 0 on 2/12/2025, showing low risk of alcohol abuse.      Patient Care Team:  Guilherme Borrero MD as PCP - General (Internal Medicine)  Beltran, Gabo Ovalles MD (Radiation Oncology)  Xavier Hooper MD (Radiation Oncology)  [facility], Generic Provider  Eloisa Boateng RN as Registered Nurse (Registered Nurse)    Review of Systems   Constitutional:  Negative for activity change, appetite change and fever.   HENT:  Negative for congestion and voice change.    Respiratory:  Negative for cough and shortness of breath.    Cardiovascular:  Negative for chest pain.   Gastrointestinal:  Negative for abdominal distention, abdominal pain and vomiting.   Genitourinary:  Negative for hematuria.   Skin:  Negative for wound.   Psychiatric/Behavioral:  Negative for behavioral problems.          Objective:   Physical Exam  Constitutional:       Appearance: Normal appearance.   HENT:      Head: Normocephalic.   Eyes:      Conjunctiva/sclera: Conjunctivae normal.   Cardiovascular:      Rate and Rhythm: Normal rate and regular rhythm.      Heart sounds: Normal heart sounds. No murmur heard.  Pulmonary:      Effort: Pulmonary effort is normal.      Breath sounds: Normal breath sounds. No rhonchi or rales.   Abdominal:      General: Bowel sounds are normal.      Palpations: Abdomen is soft.      Tenderness: There is no abdominal tenderness.   Musculoskeletal:      Cervical back: Neck supple.      Right lower leg: No edema.      Left lower leg: No edema.   Skin:     General: Skin is warm and dry.   Neurological:      General: No focal deficit present.      Mental Status: He is alert and oriented to person, place, and time. Mental status is at baseline.   Psychiatric:         Mood and Affect: Mood normal.          Behavior: Behavior normal.         /82   Pulse 66   Ht 5' 10\" (1.778 m)   Wt 211 lb (95.7 kg)   SpO2 99%   BMI 30.28 kg/m²  Estimated body mass index is 30.28 kg/m² as calculated from the following:    Height as of this encounter: 5' 10\" (1.778 m).    Weight as of this encounter: 211 lb (95.7 kg).    Medicare Hearing Assessment:   Hearing Screening    Time taken: 2/19/2025  8:20 AM  Screening Method: Questionnaire  I have a problem hearing over the telephone: No I have trouble following the conversations when two or more people are talking at the same time: No   I have trouble understanding things on the TV: No I have to strain to understand conversations: No   I have to worry about missing the telephone ring or doorbell: No I have trouble hearing conversations in a noisy background such as a crowded room or restaurant: No   I get confused about where sounds come from: No I misunderstand some words in a sentence and need to ask people to repeat themselves: No   I especially have trouble understanding the speech of women and children: No I have trouble understanding the speaker in a large room such as at a meeting or place of Yazdanism: No   Many people I talk to seem to mumble (or don't speak clearly): No People get annoyed because I misunderstand what they say: No   I misunderstand what others are saying and make inappropriate responses: No I avoid social activities because I cannot hear well and fear I will reply improperly: No   Family members and friends have told me they think I may have hearing loss: No                   Assessment & Plan:   Chay Andrade is a 64 year old male who presents for a Medicare Assessment.     1. Essential hypertension (Primary) controlled   -     CBC, Platelet; No Differential  -     Comp Metabolic Panel (14)  -     Lipid Panel  -     TSH W Reflex To Free T4  2. Hyperlipidemia, unspecified hyperlipidemia type check FLP and LFT  -     CBC, Platelet; No  Differential  -     Comp Metabolic Panel (14)  -     Lipid Panel  -     TSH W Reflex To Free T4  3. Impaired glucose tolerance  -     Hemoglobin A1C  4. Cervical spondylosis with myelopathy  Overview:  Dr Mcmanus and Dr Gaming at [place]  5. Spondylolisthesis, lumbar region  Overview:  s/p extreme lateral interbody fusion, L4-5 with L4-5 discectomy and fusion Dr Mcmanus    Cont lyrica, discussed regarding baclofen and diclofenac.  Can wean off as tolerated.  6. Benign prostatic hyperplasia without lower urinary tract symptoms-stable follows up with urology.  Continue with tamsulosin and finasteride  7. History of prostate cancer-currently follows up with urology for PSA surveillance.  Overview:  Dr mj SRINIVASAN    Assessment & Plan    The patient indicates understanding of these issues and agrees to the plan.  Reinforced healthy diet, lifestyle, and exercise.      No follow-ups on file.     Guilherme Borrero MD, 2/19/2025     Supplementary Documentation:   General Health:  In the past six months, have you lost more than 10 pounds without trying?: (Patient-Rptd) 2 - No  Has your appetite been poor?: (Patient-Rptd) No  Type of Diet: (Patient-Rptd) Balanced;Low Salt;Low Carb  How does the patient maintain a good energy level?: (Patient-Rptd) Daily Walks;Stretching  How would you describe your daily physical activity?: (Patient-Rptd) Light  How would you describe your current health state?: (Patient-Rptd) Fair  How do you maintain positive mental well-being?: (Patient-Rptd) Games  On a scale of 0 to 10, with 0 being no pain and 10 being severe pain, what is your pain level?: (Patient-Rptd) 6 - (Moderate)  In the past six months, have you experienced urine leakage?: (Patient-Rptd) 1-Yes  At any time do you feel concerned for the safety/well-being of yourself and/or your children, in your home or elsewhere?: (Patient-Rptd) No  Have you had any immunizations at another office such as Influenza, Hepatitis B, Tetanus, or  Pneumococcal?: (Patient-Rptd) Yes    Health Maintenance   Topic Date Due    Annual Well Visit  01/01/2025    COVID-19 Vaccine (9 - 2024-25 season) 04/18/2025    PSA  02/17/2027    Colorectal Cancer Screening  10/08/2031    Influenza Vaccine  Completed    Annual Depression Screening  Completed    Fall Risk Screening (Annual)  Completed    Pneumococcal Vaccine: 50+ Years  Completed    Zoster Vaccines  Completed    Meningococcal B Vaccine  Aged Out

## 2025-02-25 ENCOUNTER — OFFICE VISIT (OUTPATIENT)
Dept: RADIATION ONCOLOGY | Facility: HOSPITAL | Age: 65
End: 2025-02-25
Attending: RADIOLOGY
Payer: MEDICARE

## 2025-02-25 VITALS
HEART RATE: 66 BPM | OXYGEN SATURATION: 96 % | BODY MASS INDEX: 30 KG/M2 | SYSTOLIC BLOOD PRESSURE: 124 MMHG | DIASTOLIC BLOOD PRESSURE: 65 MMHG | RESPIRATION RATE: 16 BRPM | WEIGHT: 211.81 LBS | TEMPERATURE: 98 F

## 2025-02-25 DIAGNOSIS — C61 PROSTATE CANCER (HCC): Primary | ICD-10-CM

## 2025-02-25 PROCEDURE — 99211 OFF/OP EST MAY X REQ PHY/QHP: CPT

## 2025-02-25 NOTE — PATIENT INSTRUCTIONS
- WE WILL CALL TO SCHEDULE YOUR FOLLOW-UP APPOINTMENT WITH DR. BECERRA IN 1 YEAR    - INCREASE FLOMAX (TAMSULOSIN) TO 0.8 MG (2 TABLETS) AT BEDTIME    - PSA BLOOD WORK IN AUGUST 2025, DR. BECERRA WILL CALL WITH RESULTS    - CALL [phone] IF YOU HAVE ANY QUESTIONS/CONCERNS REGARDING RADIATION THERAPY

## 2025-02-25 NOTE — PROGRESS NOTES
Nursing Follow-Up Note    Patient: Chay Andrade  YOB: 1960  Age: 64 year old  Radiation Oncologist: Dr. Gabo Beltran  Referring Physician: No ref. provider found  Chief Complaint:   Chief Complaint   Patient presents with    Follow - Up     PROSTATE CA     Date: 2/24/2025    Toxicities:   N/A    Vital Signs: /65 (BP Location: Left arm, Patient Position: Sitting, Cuff Size: large)   Pulse 66   Temp 97.8 °F (36.6 °C) (Temporal)   Resp 16   Wt 96.1 kg (211 lb 12.8 oz)   SpO2 96%   BMI 30.39 kg/m² ,   Wt Readings from Last 6 Encounters:   02/25/25 96.1 kg (211 lb 12.8 oz)   02/19/25 95.7 kg (211 lb)   10/03/24 90.2 kg (198 lb 12.8 oz)   08/20/24 89.4 kg (197 lb)   06/04/24 92.3 kg (203 lb 6.4 oz)   04/03/24 94.2 kg (207 lb 9.6 oz)       Allergies:  Allergies[1]    Nursing Note:   Patient here for follow up with Dr. Beltran. Completed prostate RT on 11/2/22. Last seen 6/4/24. PSA drawn 2/17/25 = 0.10. Pt feels well, no c/o proctitis. Denies dysuria, hematuria or incontinence. AUA= 15 , KANDICE= 0.        [1] No Known Allergies

## 2025-02-25 NOTE — PROGRESS NOTES
RADIATION ONCOLOGY NOTE    DATE OF VISIT: 2/25/2025    DIAGNOSIS :  Stage II T1c N0 M0 , G3+4, carcinoma of the prostate status post biopsy pretreatment PSA max 5.49, s/p definitive XRT on 11/2/2022, current PSA 1.95--> 0.83,--> 0.28--> 0.10  doing well, to continue f/u q 6 months    Dear Yanet Martinez, Calin, and colleagues,     As you recall, Chay Andrade is a pleasant 64 year old male, diagnosed with low intermediate risk prostate cancer.  Patient is on Flomax at baseline,  has a history of chronic back pain.  He is taking Tylenol for LUTS as well.  PSA has severino as below.   PT at baseline has nocturia and drinks plenty of fluids.  He is on Flomax every day and previously was on BID.  He otherwise denies GI symptoms.  Pt is on multiple BP meds and lyrica and c/o of fatigue.    Recent Results (from the past [id number] hours)   PSA - DIAGNOSTIC [E]    Collection Time: 02/17/25  7:31 AM   Result Value Ref Range    Total PSA  0.10 <=4.00 ng/mL     *Note: Due to a large number of results and/or encounters for the requested time period, some results have not been displayed. A complete set of results can be found in Results Review.        Recent Labs   Lab 02/19/25  0914   WBC 2.6*   HGB 14.9   .0       Recent Results (from the past 913847 hours)   PSA - DIAGNOSTIC [E]    Collection Time: 02/17/25  7:31 AM   Result Value Ref Range    Total PSA  0.10 <=4.00 ng/mL     *Note: Due to a large number of results and/or encounters for the requested time period, some results have not been displayed. A complete set of results can be found in Results Review.     PSA:    Lab Results   Component Value Date    PSA 0.10 02/17/2025    PSA 0.15 08/20/2024    PSA 0.28 02/09/2024    PSA 0.83 08/02/2023    PSA 1.95 02/01/2023         ROS    A 12 Point review of system was obtained and is as above and per HPI and nursing note.         Current Outpatient Medications   Medication Sig Dispense Refill    pregabalin 75 MG Oral Cap Take  1 capsule (75 mg total) by mouth 2 (two) times daily as needed.      amLODIPine 10 MG Oral Tab Take 1 tablet (10 mg total) by mouth daily. 90 tablet 3    MONTELUKAST 10 MG Oral Tab TAKE 1 TABLET NIGHTLY 90 tablet 0    Benazepril-hydroCHLOROthiazide 20-12.5 MG Oral Tab Take 1 tablet by mouth daily. 90 tablet 3    atorvastatin 10 MG Oral Tab Take 1 tablet (10 mg total) by mouth every other day. 45 tablet 3    fluticasone propionate 50 MCG/ACT Nasal Suspension 1 spray by Nasal route 2 (two) times daily. 16 g 2    azelastine 0.1 % Nasal Solution 2 sprays by Nasal route 2 (two) times daily. 30 mL 2    tamsulosin 0.4 MG Oral Cap Take 1 capsule (0.4 mg total) by mouth in the morning and 1 capsule (0.4 mg total) before bedtime. 180 capsule 3    finasteride 5 MG Oral Tab Take 1 tablet (5 mg total) by mouth daily.      aspirin 81 MG Oral Tab EC Take 1 tablet (81 mg total) by mouth daily.      baclofen 10 MG Oral Tab Take 1 tablet (10 mg total) by mouth 3 (three) times daily.      cholecalciferol 125 MCG (5000 UT) Oral Cap Take 1 capsule (5,000 Units total) by mouth daily.      diclofenac 75 MG Oral Tab EC Take 1 tablet (75 mg total) by mouth daily. (Patient not taking: Reported on 2/25/2025)         PAIN:   , Pain Score: 0,     ,  ,     ,  ,      ALLERGIES :   Allergies[1]    PAST MEDICAL HISTORY:   has a past medical history of Back problem, Calculus of kidney, High blood pressure, High cholesterol, Osteoarthritis, PONV (postoperative nausea and vomiting), and Visual impairment.    He has no past medical history of Diabetes (HCC), Difficult intubation, History of blood transfusion, Malignant hyperthermia, Pseudocholinesterase deficiency, Sleep apnea, or Type 1 diabetes mellitus (HCC).    PAST SURGICAL HISTORY:   has a past surgical history that includes cyst removal (cyst removal on lower spine ); arthroscopy of joint unlisted (Left) (rotator cuff repair and tendon repair ); spine surgery procedure unlisted (CERVICAL  FUSION); and colonoscopy.    PAST SOCIAL HISTORY   reports that he has never smoked. He has never used smokeless tobacco. He reports that he does not drink alcohol and does not use drugs.    PAST FAMILY HISTORY   family history includes Diabetes in his mother.    Wt Readings from Last 6 Encounters:   02/25/25 96.1 kg (211 lb 12.8 oz)   02/19/25 95.7 kg (211 lb)   10/03/24 90.2 kg (198 lb 12.8 oz)   08/20/24 89.4 kg (197 lb)   06/04/24 92.3 kg (203 lb 6.4 oz)   04/03/24 94.2 kg (207 lb 9.6 oz)        PHYSICAL EXAM  Blood pressure 124/65, pulse 66, temperature 97.8 °F (36.6 °C), temperature source Temporal, resp. rate 16, weight 96.1 kg (211 lb 12.8 oz), SpO2 96%.  PERFORMANCE STATUS 0 ,  denies PAIN  GENERAL:  Pt is alert and oriented times three in no acute distress.    HEENT:  PERRLA, EOMI,   NECK:  Supple with no  lymphadenopathy.   CHEST:  Clear   ABDOMEN:  Soft with no masses.   EXTREMITIES:  There is no upper or lower extremity edema.    NEUROLOGIC:  Cranial nerves II-XII are intact.  Neuro exam is nonfocal.    COMPLETED TESTS:  I have reviewed the patient's clinical, radiographic, pathologic and laboratory studies.    ASSESSMENT/PLAN    65 yo with Stage II T1c N0 M0 , G3+4, carcinoma of the prostate status post biopsy pretreatment PSA max 5.49, s/p definitive XRT on 11/2/2022, current PSA 1.95--> 0.83,--> 0.28--> 0.10  doing well, to continue f/u q 6 months PSA    Ideally pt will have long term PSA control and will f/u in 6 months via telephone.      Pt will resume flomax BID and increase his physical activity as well.     Thank you for allowing me to take care of your patient.  Please do not hesitate to contact me directly.    Gabo Beltran MD, FACRO  Radiation Oncology  [email or web address]  [phone]    2/25/2025                        [1] No Known Allergies

## 2025-03-27 ENCOUNTER — PATIENT MESSAGE (OUTPATIENT)
Dept: INTERNAL MEDICINE CLINIC | Facility: CLINIC | Age: 65
End: 2025-03-27

## 2025-03-28 ENCOUNTER — HOSPITAL ENCOUNTER (OUTPATIENT)
Dept: GENERAL RADIOLOGY | Facility: HOSPITAL | Age: 65
Discharge: HOME | End: 2025-03-28
Payer: MEDICARE

## 2025-03-28 ENCOUNTER — NURSE TRIAGE (OUTPATIENT)
Facility: CLINIC | Age: 65
End: 2025-03-28

## 2025-03-28 ENCOUNTER — OFFICE VISIT (OUTPATIENT)
Dept: INTERNAL MEDICINE CLINIC | Facility: CLINIC | Age: 65
End: 2025-03-28
Payer: MEDICARE

## 2025-03-28 VITALS
BODY MASS INDEX: 30.35 KG/M2 | DIASTOLIC BLOOD PRESSURE: 78 MMHG | HEART RATE: 72 BPM | OXYGEN SATURATION: 96 % | HEIGHT: 70 IN | WEIGHT: 212 LBS | SYSTOLIC BLOOD PRESSURE: 134 MMHG

## 2025-03-28 DIAGNOSIS — M25.561 ACUTE PAIN OF RIGHT KNEE: Primary | ICD-10-CM

## 2025-03-28 DIAGNOSIS — M25.561 ACUTE PAIN OF RIGHT KNEE: ICD-10-CM

## 2025-03-28 PROCEDURE — 73564 X-RAY EXAM KNEE 4 OR MORE: CPT

## 2025-03-28 PROCEDURE — 99213 OFFICE O/P EST LOW 20 MIN: CPT

## 2025-03-28 NOTE — TELEPHONE ENCOUNTER
Action Requested: Summary for Provider     []  Critical Lab, Recommendations Needed  [] Need Additional Advice  []   FYI    []   Need Orders  [] Need Medications Sent to Pharmacy  []  Other     SUMMARY: Right knee pain, pain level 7-8/10, able to walk but painful, not swollen, denied any injury, takes tylenol for pain .        Future Appointments   Date Time Provider Department Center   3/28/2025 11:30 AM Guera Daley APRN [facility]                     Reason for call: Knee Pain  Onset: 10 days - 2 weeks        Reason for Disposition   MODERATE pain (e.g., symptoms interfere with work or school, limping) and present > 3 days    Protocols used: Knee Pain-A-[place]     Bed: 08  Expected date:   Expected time:   Means of arrival:   Comments:  Triage

## 2025-03-28 NOTE — PROGRESS NOTES
Subjective:   Chay Andrade is a 65 year old male who presents for Knee Pain     C/o right knee pain starting about 2 weeks ago   Getting worse the past few days   Denies any inciting incident, no falls or abnormal activity  Was walking one day when it started and he started limping   Pain is located in the front and sides  Exacerbated by standing, walking, bending the knees   No swelling     History/Other:    Chief Complaint Reviewed and Verified  No Further Nursing Notes to   Review  Tobacco Reviewed  Allergies Reviewed  Medications Reviewed    Problem List Reviewed  Medical History Reviewed  Surgical History   Reviewed  Family History Reviewed         Tobacco:  He has never smoked tobacco.    Current Outpatient Medications   Medication Sig Dispense Refill    pregabalin 75 MG Oral Cap Take 1 capsule (75 mg total) by mouth 2 (two) times daily as needed.      amLODIPine 10 MG Oral Tab Take 1 tablet (10 mg total) by mouth daily. 90 tablet 3    MONTELUKAST 10 MG Oral Tab TAKE 1 TABLET NIGHTLY 90 tablet 0    Benazepril-hydroCHLOROthiazide 20-12.5 MG Oral Tab Take 1 tablet by mouth daily. 90 tablet 3    atorvastatin 10 MG Oral Tab Take 1 tablet (10 mg total) by mouth every other day. 45 tablet 3    fluticasone propionate 50 MCG/ACT Nasal Suspension 1 spray by Nasal route 2 (two) times daily. 16 g 2    azelastine 0.1 % Nasal Solution 2 sprays by Nasal route 2 (two) times daily. 30 mL 2    tamsulosin 0.4 MG Oral Cap Take 1 capsule (0.4 mg total) by mouth in the morning and 1 capsule (0.4 mg total) before bedtime. 180 capsule 3    finasteride 5 MG Oral Tab Take 1 tablet (5 mg total) by mouth daily.      aspirin 81 MG Oral Tab EC Take 1 tablet (81 mg total) by mouth daily.      baclofen 10 MG Oral Tab Take 1 tablet (10 mg total) by mouth 3 (three) times daily.      cholecalciferol 125 MCG (5000 UT) Oral Cap Take 1 capsule (5,000 Units total) by mouth daily.      diclofenac 75 MG Oral Tab EC Take 1 tablet (75 mg  total) by mouth daily. (Patient not taking: Reported on 3/28/2025)         Review of Systems:  Review of Systems  10 point review of systems otherwise negative with the exception of HPI and assessment and plan    Objective:   /78   Pulse 72   Ht 5' 10\" (1.778 m)   Wt 212 lb (96.2 kg)   SpO2 96%   BMI 30.42 kg/m²  Estimated body mass index is 30.42 kg/m² as calculated from the following:    Height as of this encounter: 5' 10\" (1.778 m).    Weight as of this encounter: 212 lb (96.2 kg).  Physical Exam  Vitals reviewed.   Constitutional:       General: He is not in acute distress.     Appearance: Normal appearance. He is well-developed.   Cardiovascular:      Rate and Rhythm: Normal rate and regular rhythm.      Heart sounds: Normal heart sounds.   Pulmonary:      Effort: Pulmonary effort is normal.      Breath sounds: Normal breath sounds.   Musculoskeletal:      Right knee: No swelling, deformity or erythema. Normal range of motion.      Left knee: No swelling, deformity or erythema. Normal range of motion.   Skin:     General: Skin is warm and dry.   Neurological:      Mental Status: He is alert and oriented to person, place, and time.         Assessment & Plan:   1. Acute pain of right knee (Primary)  -     XR KNEE, COMPLETE (4 OR MORE VIEWS), RIGHT (CPT=73564); Future; Expected date: 03/28/2025  Ibuprofen 800mg twice a day with food   Ice or heat   Avoid squatting movements   Declines PT at this time    JUDITH Heard, 3/28/2025, 11:44 AM

## 2025-04-01 RX ORDER — TAMSULOSIN HYDROCHLORIDE 0.4 MG/1
0.4 CAPSULE ORAL 2 TIMES DAILY
Qty: 180 CAPSULE | Refills: 3 | Status: SHIPPED | OUTPATIENT
Start: 2025-04-01

## 2025-04-01 NOTE — TELEPHONE ENCOUNTER
Refill passed per [facility] protocols.    Requested Prescriptions   Pending Prescriptions Disp Refills    tamsulosin 0.4 MG Oral Cap 180 capsule 3     Sig: Take 1 capsule (0.4 mg total) by mouth in the morning and 1 capsule (0.4 mg total) before bedtime.       Genitourinary Medications Passed - 4/1/2025 10:46 AM

## 2025-04-03 DIAGNOSIS — M25.461 EFFUSION, RIGHT KNEE: Primary | ICD-10-CM

## 2025-04-11 RX ORDER — MONTELUKAST SODIUM 10 MG/1
10 TABLET ORAL NIGHTLY
Qty: 90 TABLET | Refills: 0 | OUTPATIENT
Start: 2025-04-11

## 2025-04-11 NOTE — TELEPHONE ENCOUNTER
This refill request is being sent to the provider for the following reason:  [x]Patient has not had an appointment within the past 12 months   []Medication is not within protocol  [x]Patient did not complete follow up recommendations  [x]Other: Last Office visit 2/22/24    Doctor please advise.

## 2025-04-15 RX ORDER — MONTELUKAST SODIUM 10 MG/1
10 TABLET ORAL NIGHTLY
Qty: 90 TABLET | Refills: 0 | OUTPATIENT
Start: 2025-04-15

## 2025-04-21 ENCOUNTER — OFFICE VISIT (OUTPATIENT)
Dept: AUDIOLOGY | Facility: CLINIC | Age: 65
End: 2025-04-21

## 2025-04-21 ENCOUNTER — TELEPHONE (OUTPATIENT)
Dept: SLEEP CENTER | Age: 65
End: 2025-04-21

## 2025-04-21 ENCOUNTER — OFFICE VISIT (OUTPATIENT)
Dept: OTOLARYNGOLOGY | Facility: CLINIC | Age: 65
End: 2025-04-21
Payer: MEDICARE

## 2025-04-21 DIAGNOSIS — Q18.1 CONGENITAL PREAURICULAR PIT: ICD-10-CM

## 2025-04-21 DIAGNOSIS — H90.3 SENSORINEURAL HEARING LOSS (SNHL) OF BOTH EARS: Primary | ICD-10-CM

## 2025-04-21 DIAGNOSIS — G47.33 OBSTRUCTIVE SLEEP APNEA: ICD-10-CM

## 2025-04-21 DIAGNOSIS — H91.8X3 ASYMMETRICAL HEARING LOSS: ICD-10-CM

## 2025-04-21 PROCEDURE — 1160F RVW MEDS BY RX/DR IN RCRD: CPT | Performed by: SPECIALIST

## 2025-04-21 PROCEDURE — 92557 COMPREHENSIVE HEARING TEST: CPT | Performed by: AUDIOLOGIST

## 2025-04-21 PROCEDURE — 1159F MED LIST DOCD IN RCRD: CPT | Performed by: SPECIALIST

## 2025-04-21 PROCEDURE — 99213 OFFICE O/P EST LOW 20 MIN: CPT | Performed by: SPECIALIST

## 2025-04-21 PROCEDURE — 92567 TYMPANOMETRY: CPT | Performed by: AUDIOLOGIST

## 2025-04-21 RX ORDER — MONTELUKAST SODIUM 10 MG/1
10 TABLET ORAL NIGHTLY
Qty: 90 TABLET | Refills: 3 | Status: SHIPPED | OUTPATIENT
Start: 2025-04-21

## 2025-04-22 NOTE — PROGRESS NOTES
Chay Andrade is a 65 year old male.   Chief Complaint   Patient presents with    Follow - Up     Yearly audiogram, ear cleaning      HPI:   Patient here to get his ears rechecked.  Also has difficulty sleeping.    Current Medications[1]   Past Medical History[2]   Social History:  Short Social Hx on File[3]     REVIEW OF SYSTEMS:   GENERAL HEALTH: feels well otherwise  GENERAL : denies fever, chills, sweats, weight loss, weight gain  SKIN: denies any unusual skin lesions or rashes  RESPIRATORY: denies shortness of breath with exertion  NEURO: denies headaches    EXAM:   There were no vitals taken for this visit.  System Details   Skin Inspection - Normal.   Constitutional Overall appearance - Normal.   Head/Face Facial features - Normal. Eyebrows - Normal. Skull - Normal.   Eyes Conjunctiva - Right: Normal, Left: Normal. Pupil - Right: Normal, Left: Normal.    Ears Inspection - Right: Right preauricular pit, left: Normal.   Canal - Right: Normal, Left: Normal.   TM - Right: Normal, Left: Normal.   Nasal External nose - Normal.   Nasal septum - Normal.  Turbinates - Normal.   Oral/Oropharynx Lips - Normal, Tonsils - Normal, Tongue - Normal.  Elongated palate and uvula   Neck Exam Inspection - Normal. Palpation - Normal. Parotid gland - Normal. Thyroid gland - Normal.   Lymph Detail Submental. Submandibular. Anterior cervical. Posterior cervical. Supraclavicular all without enlargement   Psychiatric Orientation - Oriented to time, place, person & situation. Appropriate mood and affect.   Neurological Memory - Normal. Cranial nerves - Cranial nerves II through XII grossly intact.     ASSESSMENT AND PLAN:   1. Obstructive sleep apnea  Patient with loud snoring and elongated palate and uvula.  Will get sleep study.  Also patient has a hard time sleeping and daytime somnolence.  - DIAGOSTIC SLEEP STUDY  - General sleep study; Future    2. Congenital preauricular pit  On the right.    3. Asymmetrical hearing  loss  Asymmetric hearing loss slightly worse right than left.  Compared to audiogram done 2/22/2025.  No progression of hearing loss.  Patient was given a copy of the audiogram at the time of his visit.  - Audiology Referral - [facility])      The patient indicates understanding of these issues and agrees to the plan.      Gillian Randall MD  4/21/2025  9:11 PM       [1]   Current Outpatient Medications   Medication Sig Dispense Refill    montelukast 10 MG Oral Tab Take 1 tablet (10 mg total) by mouth nightly. 90 tablet 3    tamsulosin 0.4 MG Oral Cap Take 1 capsule (0.4 mg total) by mouth in the morning and 1 capsule (0.4 mg total) before bedtime. 180 capsule 3    pregabalin 75 MG Oral Cap Take 1 capsule (75 mg total) by mouth 2 (two) times daily as needed.      amLODIPine 10 MG Oral Tab Take 1 tablet (10 mg total) by mouth daily. 90 tablet 3    Benazepril-hydroCHLOROthiazide 20-12.5 MG Oral Tab Take 1 tablet by mouth daily. 90 tablet 3    atorvastatin 10 MG Oral Tab Take 1 tablet (10 mg total) by mouth every other day. 45 tablet 3    fluticasone propionate 50 MCG/ACT Nasal Suspension 1 spray by Nasal route 2 (two) times daily. 16 g 2    azelastine 0.1 % Nasal Solution 2 sprays by Nasal route 2 (two) times daily. 30 mL 2    finasteride 5 MG Oral Tab Take 1 tablet (5 mg total) by mouth daily.      aspirin 81 MG Oral Tab EC Take 1 tablet (81 mg total) by mouth daily.      baclofen 10 MG Oral Tab Take 1 tablet (10 mg total) by mouth 3 (three) times daily.      cholecalciferol 125 MCG (5000 UT) Oral Cap Take 1 capsule (5,000 Units total) by mouth daily.      diclofenac 75 MG Oral Tab EC Take 1 tablet (75 mg total) by mouth daily. (Patient not taking: Reported on 4/21/2025)     [2]   Past Medical History:   Back problem    Calculus of kidney    20 yrs ago [facility]     High blood pressure    High cholesterol    Osteoarthritis    PONV (postoperative nausea and vomiting)    Visual impairment    glasses   [3]    Social History  Socioeconomic History    Marital status:    Tobacco Use    Smoking status: Never    Smokeless tobacco: Never   Vaping Use    Vaping status: Never Used   Substance and Sexual Activity    Alcohol use: Never    Drug use: Never    Sexual activity: Yes     Social Drivers of Health     Food Insecurity: No Food Insecurity (2/19/2025)    NCSS - Food Insecurity     Worried About Running Out of Food in the Last Year: No     Ran Out of Food in the Last Year: No   Transportation Needs: No Transportation Needs (2/19/2025)    NCSS - Transportation     Lack of Transportation: No   Housing Stability: Not At Risk (2/19/2025)    NCSS - Housing/Utilities     Has Housing: Yes     Worried About Losing Housing: No     Unable to Get Utilities: No

## 2025-04-22 NOTE — PATIENT INSTRUCTIONS
A sleep study was ordered for your obstructive sleep apnea.  I will of course notify you of the results.  We reviewed your audiogram done on the date of your visit and compared it to the 1 done a year ago on 2/22/2024.  There is a persistent asymmetric loss on the right ear.  It is stable.  You have an incidental right preauricular pit which is of no significance.

## 2025-04-30 RX ORDER — MONTELUKAST SODIUM 10 MG/1
10 TABLET ORAL NIGHTLY
Qty: 90 TABLET | Refills: 0 | OUTPATIENT
Start: 2025-04-30

## 2025-06-25 ENCOUNTER — OFFICE VISIT (OUTPATIENT)
Dept: SLEEP CENTER | Age: 65
End: 2025-06-25
Attending: SPECIALIST
Payer: MEDICARE

## 2025-06-25 DIAGNOSIS — G47.33 OBSTRUCTIVE SLEEP APNEA: ICD-10-CM

## 2025-06-25 PROCEDURE — 95806 SLEEP STUDY UNATT&RESP EFFT: CPT

## 2025-06-27 ENCOUNTER — PATIENT MESSAGE (OUTPATIENT)
Dept: OTOLARYNGOLOGY | Facility: CLINIC | Age: 65
End: 2025-06-27

## 2025-06-27 DIAGNOSIS — G47.33 OBSTRUCTIVE SLEEP APNEA: Primary | ICD-10-CM

## 2025-06-27 NOTE — PROCEDURES
[facility]  Accredited by the American Academy of Sleep Medicine (AASM)    PATIENT'S NAME: JAI ORELLANA   ATTENDING PHYSICIAN: Guilherme Borrero MD   REFERRING PHYSICIAN: Gillian Randall MD   PATIENT ACCOUNT #: [id number] LOCATION: Sleep Center   MEDICAL RECORD #: [id number] YOB: 1960   DATE OF STUDY: 06/25/2025       SLEEP STUDY REPORT    STUDY TYPE:  Home sleep study.    INDICATION:  Suspected obstructive sleep apnea (ICD 10 code G47.33) in patient with snoring, night sweats, excessive daytime somnolence, body mass index 30.7, an [place] Sleepiness Scale score of 11/24, and elongated palate and uvula.    RESULTS:  The patient underwent home sleep test with measurement of his nasal air flow, nasal air pressure, snoring, chest and abdominal wall motion, oximetry, and body position.  I have reviewed the entirety of the raw data of this study.  During this study, total recording time is 589 minutes.  The lights-out clock time is 10:11 p.m., the lights-on clock time is 8:01 a.m.  The apnea plus hypopnea index is 18.2 events per hour.  The supine apnea plus hypopnea index is 27.1 events per hour.  The average oxygen saturation is 97%, the lowest oxygen saturation is 84%, and the patient spent 0.3% of the test with saturations 88% or less.  The average heart rate was 72 beats per minute, and the patient spent approximately 65% of the test in the supine position.    INTERPRETATION:  The data generated from this study is consistent with moderate obstructive sleep apnea which is worse in the supine position (ICD 10 code G47.33).    RECOMMENDATIONS:    1.   CPAP titration.  2.   Weight loss.  3.   Avoid alcohol.  4.   Avoid sedating drug.  5.   Patient should not drive if at all sleepy.    Please do not hesitate to contact me if there is any question whatsoever regarding interpretation of this study.    Dictated By Erick Randall MD  d: 06/27/2025 15:44:06  t: 06/27/2025  15:50:37  [facility] [id number]  [facility]/    cc: MD Gillian Dale MD

## 2025-07-24 ENCOUNTER — TELEPHONE (OUTPATIENT)
Dept: SLEEP CENTER | Age: 65
End: 2025-07-24

## 2025-07-30 ENCOUNTER — TELEPHONE (OUTPATIENT)
Dept: SLEEP CENTER | Age: 65
End: 2025-07-30

## 2025-08-14 ENCOUNTER — OFFICE VISIT (OUTPATIENT)
Dept: SLEEP CENTER | Age: 65
End: 2025-08-14
Attending: SPECIALIST

## 2025-08-14 DIAGNOSIS — G47.33 OBSTRUCTIVE SLEEP APNEA (ADULT) (PEDIATRIC): ICD-10-CM

## 2025-08-14 DIAGNOSIS — G47.33 OBSTRUCTIVE SLEEP APNEA: Primary | ICD-10-CM

## 2025-08-14 PROCEDURE — 95811 POLYSOM 6/>YRS CPAP 4/> PARM: CPT

## 2025-08-18 ENCOUNTER — ORDER TRANSCRIPTION (OUTPATIENT)
Dept: SLEEP CENTER | Age: 65
End: 2025-08-18

## 2025-08-18 DIAGNOSIS — G47.33 OBSTRUCTIVE SLEEP APNEA (ADULT) (PEDIATRIC): Primary | ICD-10-CM

## 2025-08-19 ENCOUNTER — RESULTS FOLLOW-UP (OUTPATIENT)
Dept: SLEEP CENTER | Age: 65
End: 2025-08-19

## 2025-08-19 ENCOUNTER — LAB ENCOUNTER (OUTPATIENT)
Dept: LAB | Facility: HOSPITAL | Age: 65
End: 2025-08-19
Attending: RADIOLOGY

## 2025-08-19 DIAGNOSIS — C61 PROSTATE CANCER (HCC): ICD-10-CM

## 2025-08-19 DIAGNOSIS — G47.33 OSA (OBSTRUCTIVE SLEEP APNEA): Primary | ICD-10-CM

## 2025-08-19 LAB — PSA SERPL-MCNC: 0.15 NG/ML (ref ?–4)

## 2025-08-19 PROCEDURE — 84153 ASSAY OF PSA TOTAL: CPT

## 2025-08-19 PROCEDURE — 36415 COLL VENOUS BLD VENIPUNCTURE: CPT

## 2025-08-20 ENCOUNTER — TELEPHONE (OUTPATIENT)
Dept: RADIATION ONCOLOGY | Facility: HOSPITAL | Age: 65
End: 2025-08-20

## 2025-08-20 DIAGNOSIS — C61 PROSTATE CANCER (HCC): Primary | ICD-10-CM

## 2025-08-28 ENCOUNTER — MED REC SCAN ONLY (OUTPATIENT)
Dept: INTERNAL MEDICINE CLINIC | Facility: CLINIC | Age: 65
End: 2025-08-28

## (undated) DEVICE — SNAP KOVER: Brand: UNBRANDED

## (undated) DEVICE — KIT SPNL XLIF NRVSN DIL M5

## (undated) DEVICE — GAUZE SPONGES,12 PLY: Brand: CURITY

## (undated) DEVICE — ENCORE® LATEX MICRO SIZE 7, STERILE LATEX POWDER-FREE SURGICAL GLOVE: Brand: ENCORE

## (undated) DEVICE — SUTURE VICRYL 3-0 RB-1

## (undated) DEVICE — SOL  .9 1000ML BTL

## (undated) DEVICE — CAUTERY BLADE 2IN INS E1455

## (undated) DEVICE — DERMABOND LIQUID ADHESIVE

## (undated) DEVICE — KIT ACCESS MAXCESS 4

## (undated) DEVICE — C-ARMOR C-ARM EQUIPMENT COVERS CLEAR STERILE UNIVERSAL FIT 12 PER CASE: Brand: C-ARMOR

## (undated) DEVICE — 3M™ STERI-DRAPE™ INCISE DRAPE 1040 (35CM X 35CM): Brand: STERI-DRAPE™

## (undated) DEVICE — INSULATED BLADE ELECTRODE 6.5

## (undated) DEVICE — ENCORE® LATEX MICRO SIZE 7.5, STERILE LATEX POWDER-FREE SURGICAL GLOVE: Brand: ENCORE

## (undated) DEVICE — SUCTION CANISTER, 3000CC,SAFELINER: Brand: DEROYAL

## (undated) DEVICE — ENCORE® LATEX MICRO SIZE 8, STERILE LATEX POWDER-FREE SURGICAL GLOVE: Brand: ENCORE

## (undated) DEVICE — 3M™ STERI-DRAPE™ INCISE DRAPE 1050 (60CM X 45CM): Brand: STERI-DRAPE™

## (undated) DEVICE — NVM5 MULTIMODALITY SURFACE KIT

## (undated) DEVICE — NV I-PAS III BEVELED TIP STER

## (undated) DEVICE — DRAPE SHEET LG

## (undated) DEVICE — VIOLET BRAIDED (POLYGLACTIN 910), SYNTHETIC ABSORBABLE SUTURE: Brand: COATED VICRYL

## (undated) DEVICE — CUSHION INSERT PRONEVIEW LG

## (undated) DEVICE — ENCORE® LATEX ACCLAIM SIZE 7.5, STERILE LATEX POWDER-FREE SURGICAL GLOVE: Brand: ENCORE

## (undated) DEVICE — ENDOPATH 5MM ENDOSCOPIC BLUNT TIP DISSECTORS (12 POUCHES CONTAINING 3 DISSECTORS EACH): Brand: ENDOPATH

## (undated) DEVICE — LAMINECTOMY: Brand: MEDLINE INDUSTRIES, INC.

## (undated) DEVICE — DRAPE C-ARM UNIVERSAL

## (undated) DEVICE — DRAPE SHEET LAPAROTOMY

## (undated) DEVICE — WIRE FX KRSH SPNE

## (undated) DEVICE — PEN: MARKING STD PT 100/CS: Brand: MEDICAL ACTION INDUSTRIES

## (undated) NOTE — LETTER
Hospital Discharge Documentation  Please phone to schedule a hospital follow up appointment. No discharge summary available at this time, below is the most recent progress note  for your review .       From: [id number] [facility]  Phone: [phone] Greater than 35 minutes spent, >50% spent counseling re: treatment plan and work up.        Results:            Lab Results   Component Value Date     WBC 4.2 10/28/2020     HGB 15.0 10/28/2020     HCT 44.7 10/28/2020     .0 10/28/2020     CREATSERU